# Patient Record
Sex: MALE | Race: WHITE | URBAN - METROPOLITAN AREA
[De-identification: names, ages, dates, MRNs, and addresses within clinical notes are randomized per-mention and may not be internally consistent; named-entity substitution may affect disease eponyms.]

---

## 2017-03-18 ENCOUNTER — RADIANT APPOINTMENT (OUTPATIENT)
Dept: GENERAL RADIOLOGY | Facility: CLINIC | Age: 62
End: 2017-03-18
Attending: FAMILY MEDICINE
Payer: OTHER MISCELLANEOUS

## 2017-03-18 ENCOUNTER — OFFICE VISIT (OUTPATIENT)
Dept: URGENT CARE | Facility: URGENT CARE | Age: 62
End: 2017-03-18
Payer: OTHER MISCELLANEOUS

## 2017-03-18 VITALS
DIASTOLIC BLOOD PRESSURE: 87 MMHG | OXYGEN SATURATION: 98 % | HEART RATE: 91 BPM | BODY MASS INDEX: 36.68 KG/M2 | HEIGHT: 75 IN | WEIGHT: 295 LBS | SYSTOLIC BLOOD PRESSURE: 144 MMHG | TEMPERATURE: 97.8 F

## 2017-03-18 DIAGNOSIS — S82.401A TIBIA/FIBULA FRACTURE, RIGHT, CLOSED, INITIAL ENCOUNTER: ICD-10-CM

## 2017-03-18 DIAGNOSIS — Z02.6 ENCOUNTER RELATED TO WORKER'S COMPENSATION CLAIM: ICD-10-CM

## 2017-03-18 DIAGNOSIS — S82.201A TIBIA/FIBULA FRACTURE, RIGHT, CLOSED, INITIAL ENCOUNTER: ICD-10-CM

## 2017-03-18 DIAGNOSIS — S97.81XA CRUSH INJURY OF RIGHT FOOT, INITIAL ENCOUNTER: Primary | ICD-10-CM

## 2017-03-18 DIAGNOSIS — S82.54XA CLOSED NONDISPLACED FRACTURE OF MEDIAL MALLEOLUS OF RIGHT TIBIA, INITIAL ENCOUNTER: ICD-10-CM

## 2017-03-18 PROCEDURE — 73610 X-RAY EXAM OF ANKLE: CPT | Mod: RT

## 2017-03-18 PROCEDURE — 99214 OFFICE O/P EST MOD 30 MIN: CPT | Performed by: FAMILY MEDICINE

## 2017-03-18 PROCEDURE — 73630 X-RAY EXAM OF FOOT: CPT | Mod: RT

## 2017-03-18 NOTE — PATIENT INSTRUCTIONS
1. Crush injury of right foot, initial encounter  - XR Foot Right G/E 3 Views  - XR Ankle Right G/E 3 Views  - ORTHO  REFERRAL; Future    2. Tibia/fibula fracture, right, closed, initial encounter: placed on camp boot  -Tylenol or ibuprofen prn for pain-control   -Letter to work provided  -Schedule an appointment with orthopedics within 1-2 days   - ORTHO  REFERRAL; Future    3. Closed nondisplaced fracture of medial malleolus of right tibia, initial encounter  - ORTHO  REFERRAL; Future

## 2017-03-18 NOTE — MR AVS SNAPSHOT
After Visit Summary   3/18/2017    Kaiser Sylvester    MRN: [id number]           Patient Information     Date Of Birth          1955        Visit Information        Provider Department      3/18/2017 9:30 AM Rocky Torrez MD [facility]        Today's Diagnoses     Crush injury of right foot, initial encounter    -  1    Tibia/fibula fracture, right, closed, initial encounter        Closed nondisplaced fracture of medial malleolus of right tibia, initial encounter          Care Instructions    1. Crush injury of right foot, initial encounter  - XR Foot Right G/E 3 Views  - XR Ankle Right G/E 3 Views  - ORTHO  REFERRAL; Future    2. Tibia/fibula fracture, right, closed, initial encounter: placed on camp boot  -Tylenol or ibuprofen prn for pain-control   -Letter to work provided  -Schedule an appointment with orthopedics within 1-2 days   - ORTHO  REFERRAL; Future    3. Closed nondisplaced fracture of medial malleolus of right tibia, initial encounter  - ORTHO  REFERRAL; Future        Follow-ups after your visit        Additional Services     ORTHO  REFERRAL       [facility] is referring you to the Orthopedic  Services at [facility].       The  Representative will assist you in the coordination of your Orthopedic and Musculoskeletal Care as prescribed by your physician.    The  Representative will call you within 1 business day to help schedule your appointment, or you may contact the  Representative at:    All areas ~ [phone]     Type of Referral : [facility]       Timeframe requested: 1 - 2 days    Coverage of these services is subject to the terms and limitations of your health insurance plan.  Please call member services at your health plan with any benefit or coverage questions.      If X-rays, CT or MRI's have been performed,  "please contact the facility where they were done to arrange for , prior to your scheduled appointment.  Please bring this referral request to your appointment and present it to your specialist.                  Future tests that were ordered for you today     Open Future Orders        Priority Expected Expires Ordered    ORTHO  REFERRAL ASAP  3/18/2018 3/18/2017            Who to contact     If you have questions or need follow up information about today's clinic visit or your schedule please contact [facility] directly at [phone].  Normal or non-critical lab and imaging results will be communicated to you by Diabetohart, letter or phone within 4 business days after the clinic has received the results. If you do not hear from us within 7 days, please contact the clinic through Espinelat or phone. If you have a critical or abnormal lab result, we will notify you by phone as soon as possible.  Submit refill requests through v2 Ratings or call your pharmacy and they will forward the refill request to us. Please allow 3 business days for your refill to be completed.          Additional Information About Your Visit        DiabetoharIND Lifetech Information     v2 Ratings lets you send messages to your doctor, view your test results, renew your prescriptions, schedule appointments and more. To sign up, go to [email or web address] . Click on \"Log in\" on the left side of the screen, which will take you to the Welcome page. Then click on \"Sign up Now\" on the right side of the page.     You will be asked to enter the access code listed below, as well as some personal information. Please follow the directions to create your username and password.     Your access code is: [email or web address]  Expires: 2017 12:00 PM     Your access code will  in 90 days. If you need help or a new code, please call your [facility] or [phone].        Care EveryWhere ID     This is your Care EveryWhere ID. This " "could be used by other organizations to access your [place] medical records  [id number]        Your Vitals Were     Pulse Temperature Height Pulse Oximetry BMI (Body Mass Index)       91 97.8  F (36.6  C) (Tympanic) 6' 3\" (1.905 m) 98% 36.87 kg/m2        Blood Pressure from Last 3 Encounters:   03/18/17 144/87   01/27/12 187/106   06/21/04 122/98    Weight from Last 3 Encounters:   03/18/17 295 lb (133.8 kg)   01/27/12 296 lb 9 oz (134.5 kg)   06/21/04 (!) 311 lb (141.1 kg)              We Performed the Following     XR Ankle Right G/E 3 Views     XR Foot Right G/E 3 Views        Primary Care Provider    Physician No Ref-Primary       No address on file        Thank you!     Thank you for choosing [facility]  for your care. Our goal is always to provide you with excellent care. Hearing back from our patients is one way we can continue to improve our services. Please take a few minutes to complete the written survey that you may receive in the mail after your visit with us. Thank you!             Your Updated Medication List - Protect others around you: Learn how to safely use, store and throw away your medicines at www.disposemymeds.org.          This list is accurate as of: 3/18/17 12:00 PM.  Always use your most recent med list.                   Brand Name Dispense Instructions for use    hydrochlorothiazide 25 MG tablet    HYDRODIURIL    30 tablet    Take 1 tablet by mouth daily.       LISINOPRIL PO            "

## 2017-03-18 NOTE — NURSING NOTE
"Chief Complaint   Patient presents with     Urgent Care     Ankle/Foot right     c/o right ankle and foot pain for 1 day       Initial /87 (BP Location: Left arm, Patient Position: Chair, Cuff Size: Adult Regular)  Pulse 91  Temp 97.8  F (36.6  C) (Tympanic)  Ht 6' 3\" (1.905 m)  Wt 295 lb (133.8 kg)  SpO2 98%  BMI 36.87 kg/m2 Estimated body mass index is 36.87 kg/(m^2) as calculated from the following:    Height as of this encounter: 6' 3\" (1.905 m).    Weight as of this encounter: 295 lb (133.8 kg).  Medication Reconciliation: complete   Herminia Thomas MA    "

## 2017-03-18 NOTE — LETTER
[facility]  [address] [address]  Phone: [phone]    March 18, 2017        Kaiser Sylvester  [address] [place] [facility] [address]          To whom it may concern:    RE: Kaiser Sylvester    Patient was seen and treated today at our clinic.  Patient may return to work on April 3rd, 2017  with the following:  No working or lifting restrictions    Please contact me for questions or concerns.      Sincerely,        Rocky Torrez MD

## 2017-03-18 NOTE — PROGRESS NOTES
"SUBJECTIVE:  Chief Complaint   Patient presents with     Urgent Care     Ankle/Foot right     c/o right ankle and foot pain for 1 day     Kaiser Sylvester is a 62 year old male presents with a chief complaint of right foot and leg pain, swelling and redness.  The injury occurred 1 day(s) ago.   The injury happened while at work. How: Garage  door fell on him from 10-12 ft high immediate pain, immediate swelling.  The patient complained of moderate pain  and has had decreased ROM.  Pain exacerbated by walking.  Relieved by rest.  He treated it initially with ice, heat and Tylenol. This is the first time this type of injury has occurred to this patient.   .  ROS:  CONSTITUTIONAL:NEGATIVE for fever, chills, change in weight  ENT/MOUTH: NEGATIVE for ear, mouth and throat problems  RESP:NEGATIVE for significant cough or SOB  CV: NEGATIVE for chest pain, palpitations or peripheral edema  MUSCULOSKELETAL: POSITIVE  for right leg and foot    EXAM:   /87 (BP Location: Left arm, Patient Position: Chair, Cuff Size: Adult Regular)  Pulse 91  Temp 97.8  F (36.6  C) (Tympanic)  Ht 6' 3\" (1.905 m)  Wt 295 lb (133.8 kg)  SpO2 98%  BMI 36.87 kg/m2  Gen: healthy, alert, mild distress and healthy,alert,no distress  Extremity: ankle has significant swelling at left medial malleolus and limited range of motion due to pain. Significant bruising .   There is compromise to the distal circulation.  Pulses are +2 and CRT is brisk  CHEST: clear to auscultation  CV: regular rate and rhythm  NEURO: Normal strength and tone, sensory exam grossly normal, mentation intact and speech normal    X-RAY was done.    ASSESSMENT:   1. Crush injury of right foot, initial encounter  Encounter related to worker's compensation claim: He had injury at work yesterday and had the garage door fell onto his from 10-12ft high. Xray showed fracture of distal fibula and medial malleolus.    - XR Foot Right G/E 3 Views  - XR Ankle Right G/E 3 Views  - ORTHO "  REFERRAL; Future    2. Tibia/fibula fracture, right, closed, initial encounter  -I called the orthopedic resident at [facility] and discussed the case with them. Recommended either short leg splinting or walking boot and follow up in next week. Patient is placed on walking boot. Advised to avoid weight bearing.   -Ibuprofen or tylenol prn for pain-control   -Letter to work provided.   -Advised to schedule an appointment with orthopedics within 1-2 days   - ORTHO  REFERRAL; Future    3. Closed nondisplaced fracture of medial malleolus of right tibia, initial encounter  - ORTHO  REFERRAL; Future      Rocky Torrez MD  [facility]

## 2017-03-20 ENCOUNTER — OFFICE VISIT (OUTPATIENT)
Dept: PODIATRY | Facility: CLINIC | Age: 62
End: 2017-03-20
Payer: OTHER MISCELLANEOUS

## 2017-03-20 DIAGNOSIS — S82.841A BIMALLEOLAR ANKLE FRACTURE, RIGHT, CLOSED, INITIAL ENCOUNTER: Primary | ICD-10-CM

## 2017-03-20 PROCEDURE — 99203 OFFICE O/P NEW LOW 30 MIN: CPT | Performed by: PODIATRIST

## 2017-03-20 NOTE — LETTER
"  3/20/2017       RE: Kaiser Sylvester  [address]  [place] [address]           Dear Colleague,    Thank you for referring your patient, Kaiser Sylvester, to the [facility]. Please see a copy of my visit note below.      ASSESSMENT/PLAN:    Encounter Diagnosis   Name Primary?     Bimalleolar ankle fracture, right, closed, initial encounter Yes     Although minimal displacement, this is not a stable fracture.  I have referred him to Dr. Gary, as the patient might need open reduction with internal fixation.     CAM walker  ACE compression  Ice  Elevation  Walker     There is no height or weight on file to calculate BMI.    Weight management plan: Patient was referred to their PCP to discuss a diet and exercise plan.      Michael Peña DPM, FACFAS, MS    [place] Department of Podiatry/Foot & Ankle Surgery      ____________________________________________________________________    HPI:         Chief Complaint: two fractures, right ankle.  A heavy, solid core,  12 ft utility garage door fell on the lateral aspect of his right leg, just proximal to the ankle.  Onset of problem: 4 days  Pain/ discomfort is described as:  \"not much pain when sitting\"  Ratin/10 at worst when walking  Frequency:  daily    The pain is made worse with walking  Previous treatment: ibuprofen. He was seen in urgent care on 3/18/17.  X-rays were done and CAM walker provided.   *No past medical history on file.*  *  Past Surgical History   Procedure Laterality Date     Orthopedic surgery       knee surgery as a child   *  *  Current Outpatient Prescriptions   Medication Sig Dispense Refill     LISINOPRIL PO        hydrochlorothiazide (HYDRODIURIL) 25 MG tablet Take 1 tablet by mouth daily. 30 tablet 1       ROS:     A 10-point review of systems was performed and is positive for that noted in the HPI and as seen below.  All other areas are negative.     Numbness in feet?  no   Calf pain with walking? " no  Recent foot/ankle injury? See HPI  Weight change over past 12 months? 5-10# gain  Self perception as overweight? yes  Recent flu-like symptoms? no  Joint pain other than feet ? no    Social History: Employment:  ;  Exercise/Physical activity:  Swimming 2x/ week;  Tobacco use:  no  Social History     Social History     Marital status:      Spouse name: N/A     Number of children: 1     Years of education: 12     Occupational History           Social History Main Topics     Smoking status: Former Smoker     Quit date: 6/21/1987     Smokeless tobacco: Not on file     Alcohol use Yes      Comment: has 2-3 beers, 5 nights a week     Drug use: Not on file     Sexual activity: Yes     Partners: Female     Other Topics Concern     Not on file     Social History Narrative       Family history:  Family History   Problem Relation Age of Onset     DIABETES Father      Hypertension Father      CEREBROVASCULAR DISEASE Father        Rheumatoid arthritis:  no  Foot Problems: sibling  Diabetes: parent      EXAM:    Vitals: There were no vitals taken for this visit.  BMI: There is no height or weight on file to calculate BMI.  Height: Data Unavailable    Constitutional/ general:  Pt is in no apparent distress, appears well-nourished.  Cooperative with history and physical exam.     Vascular:  Pedal pulses are palpable bilaterally for both the DP and PT arteries.  CFT < 3 sec.  Pedal hair growth noted.     Neuro:  Alert and oriented x 3. Coordinated gait.  Light touch sensation is intact to the L4, L5, S1 distributions. No obvious deficits.  No evidence of neurological-based weakness, spasticity, or contracture in the lower extremities.     Derm: Normal texture and turgor.  No erythema, ecchymosis, or cyanosis. Excoriation over the lateral right leg, just proximal to the ankle joint.     Musculoskeletal:    Lower extremity muscle strength is normal.  Patient is ambulatory without an assistive  device or brace .  Significant edema around the right ankle with some light ecchymosis. No pain with external rotation of the right foot on the ankle.  Tenderness over both the medial and lateral malleolus.      Radiographic Exam:  X-Ray Findings:  I personally reviewed the right ankle films.  Somewhat comminuted right distal fibula fracture, proximal to the ankle joint. Transverse fracture, medial malleolus, at the level of the ankle joint.  Small fracture fragment, anterior tibia. There appears to be medial widening.       Michael Peña DPM, FACFAS, MS    [place] [facility] of Podiatry/Foot & Ankle Surgery                Again, thank you for allowing me to participate in the care of your patient.        Sincerely,    Michael Peña DPM

## 2017-03-20 NOTE — MR AVS SNAPSHOT
"              After Visit Summary   3/20/2017    Kaiser Sylvester    MRN: [id number]           Patient Information     Date Of Birth          1955        Visit Information        Provider Department      3/20/2017 10:15 AM Michael Peña DPM [facility]        Today's Diagnoses     Bimalleolar ankle fracture, right, closed, initial encounter    -  1       Follow-ups after your visit        Your next 10 appointments already scheduled     Mar 21, 2017  2:45 PM CDT   New Visit with Jorge Luis Gary DPM   [facility] ([facility])    [address]  [address]  [place] [address]   [phone]              Who to contact     If you have questions or need follow up information about today's clinic visit or your schedule please contact [facility] directly at [phone].  Normal or non-critical lab and imaging results will be communicated to you by MyChart, letter or phone within 4 business days after the clinic has received the results. If you do not hear from us within 7 days, please contact the clinic through [place] reQRdoshart or phone. If you have a critical or abnormal lab result, we will notify you by phone as soon as possible.  Submit refill requests through Wavecraft or call your pharmacy and they will forward the refill request to us. Please allow 3 business days for your refill to be completed.          Additional Information About Your Visit        MyChart Information     Wavecraft lets you send messages to your doctor, view your test results, renew your prescriptions, schedule appointments and more. To sign up, go to [email or web address] . Click on \"Log in\" on the left side of the screen, which will take you to the Welcome page. Then click on \"Sign up Now\" on the right side of the page.     You will be asked to enter the access code listed below, as well as some personal information. Please follow the directions to create " your username and password.     Your access code is: [email or web address]  Expires: 2017 12:00 PM     Your access code will  in 90 days. If you need help or a new code, please call your [facility] or [phone].        Care EveryWhere ID     This is your Care EveryWhere ID. This could be used by other organizations to access your [place] medical records  [id number]         Blood Pressure from Last 3 Encounters:   17 144/87   12 187/106   04 122/98    Weight from Last 3 Encounters:   17 295 lb (133.8 kg)   12 296 lb 9 oz (134.5 kg)   04 (!) 311 lb (141.1 kg)              Today, you had the following     No orders found for display       Primary Care Provider    Physician No Ref-Primary       No address on file        Thank you!     Thank you for choosing [facility]  for your care. Our goal is always to provide you with excellent care. Hearing back from our patients is one way we can continue to improve our services. Please take a few minutes to complete the written survey that you may receive in the mail after your visit with us. Thank you!             Your Updated Medication List - Protect others around you: Learn how to safely use, store and throw away your medicines at www.disposemymeds.org.          This list is accurate as of: 3/20/17  1:14 PM.  Always use your most recent med list.                   Brand Name Dispense Instructions for use    hydrochlorothiazide 25 MG tablet    HYDRODIURIL    30 tablet    Take 1 tablet by mouth daily.       LISINOPRIL PO

## 2017-03-20 NOTE — PROGRESS NOTES
"  ASSESSMENT/PLAN:    Encounter Diagnosis   Name Primary?     Bimalleolar ankle fracture, right, closed, initial encounter Yes     Although minimal displacement, this is not a stable fracture.  I have referred him to Dr. Gary, as the patient might need open reduction with internal fixation.     CAM walker  ACE compression  Ice  Elevation  Walker     There is no height or weight on file to calculate BMI.    Weight management plan: Patient was referred to their PCP to discuss a diet and exercise plan.      Michael Peña, MIKEY, FACFAS, MS    [place] Department of Podiatry/Foot & Ankle Surgery      ____________________________________________________________________    HPI:         Chief Complaint: two fractures, right ankle.  A heavy, solid core,  12 ft utility garage door fell on the lateral aspect of his right leg, just proximal to the ankle.  Onset of problem: 4 days  Pain/ discomfort is described as:  \"not much pain when sitting\"  Ratin/10 at worst when walking  Frequency:  daily    The pain is made worse with walking  Previous treatment: ibuprofen. He was seen in urgent care on 3/18/17.  X-rays were done and CAM walker provided.   *No past medical history on file.*  *  Past Surgical History   Procedure Laterality Date     Orthopedic surgery       knee surgery as a child   *  *  Current Outpatient Prescriptions   Medication Sig Dispense Refill     LISINOPRIL PO        hydrochlorothiazide (HYDRODIURIL) 25 MG tablet Take 1 tablet by mouth daily. 30 tablet 1       ROS:     A 10-point review of systems was performed and is positive for that noted in the HPI and as seen below.  All other areas are negative.     Numbness in feet?  no   Calf pain with walking? no  Recent foot/ankle injury? See HPI  Weight change over past 12 months? 5-10# gain  Self perception as overweight? yes  Recent flu-like symptoms? no  Joint pain other than feet ? no    Social History: Employment:  ;  Exercise/Physical " activity:  Swimming 2x/ week;  Tobacco use:  no  Social History     Social History     Marital status:      Spouse name: N/A     Number of children: 1     Years of education: 12     Occupational History           Social History Main Topics     Smoking status: Former Smoker     Quit date: 6/21/1987     Smokeless tobacco: Not on file     Alcohol use Yes      Comment: has 2-3 beers, 5 nights a week     Drug use: Not on file     Sexual activity: Yes     Partners: Female     Other Topics Concern     Not on file     Social History Narrative       Family history:  Family History   Problem Relation Age of Onset     DIABETES Father      Hypertension Father      CEREBROVASCULAR DISEASE Father        Rheumatoid arthritis:  no  Foot Problems: sibling  Diabetes: parent      EXAM:    Vitals: There were no vitals taken for this visit.  BMI: There is no height or weight on file to calculate BMI.  Height: Data Unavailable    Constitutional/ general:  Pt is in no apparent distress, appears well-nourished.  Cooperative with history and physical exam.     Vascular:  Pedal pulses are palpable bilaterally for both the DP and PT arteries.  CFT < 3 sec.  Pedal hair growth noted.     Neuro:  Alert and oriented x 3. Coordinated gait.  Light touch sensation is intact to the L4, L5, S1 distributions. No obvious deficits.  No evidence of neurological-based weakness, spasticity, or contracture in the lower extremities.     Derm: Normal texture and turgor.  No erythema, ecchymosis, or cyanosis. Excoriation over the lateral right leg, just proximal to the ankle joint.     Musculoskeletal:    Lower extremity muscle strength is normal.  Patient is ambulatory without an assistive device or brace .  Significant edema around the right ankle with some light ecchymosis. No pain with external rotation of the right foot on the ankle.  Tenderness over both the medial and lateral malleolus.      Radiographic Exam:  X-Ray Findings:  I  personally reviewed the right ankle films.  Somewhat comminuted right distal fibula fracture, proximal to the ankle joint. Transverse fracture, medial malleolus, at the level of the ankle joint.  Small fracture fragment, anterior tibia. There appears to be medial widening.       Michael Peña DPM, FACFAS, MS    [place] Department of Podiatry/Foot & Ankle Surgery

## 2017-03-20 NOTE — Clinical Note
This everett will see you tomorrow.  I'll scrub with ya, if able.  I tried to use comm manager, but it didn't recognize your name.

## 2017-03-21 ENCOUNTER — OFFICE VISIT (OUTPATIENT)
Dept: PODIATRY | Facility: CLINIC | Age: 62
End: 2017-03-21
Payer: OTHER MISCELLANEOUS

## 2017-03-21 VITALS — HEART RATE: 98 BPM | HEIGHT: 75 IN | WEIGHT: 295 LBS | BODY MASS INDEX: 36.68 KG/M2 | RESPIRATION RATE: 18 BRPM

## 2017-03-21 DIAGNOSIS — S82.841D BIMALLEOLAR FRACTURE OF RIGHT ANKLE, CLOSED, WITH ROUTINE HEALING, SUBSEQUENT ENCOUNTER: Primary | ICD-10-CM

## 2017-03-21 PROCEDURE — 99213 OFFICE O/P EST LOW 20 MIN: CPT | Performed by: PODIATRIST

## 2017-03-21 NOTE — PROGRESS NOTES
"Foot & Ankle Surgery   March 21, 2017    S:  Pt is seen today for evaluation of right lower extremity work-comp bimalleolar ankle fracture, referred by Dr Peña for surgical evaluation.  He's WBAT in a CAM with a walker.  Original injury 3/17/17, a garage door fell from approximately 10 feet, brushed his head and shoulder, and landed on his ankle.  xrays at his visit with Dr Torrez showed bimalleolar fracture with lateral talar deviation.  He's been elevating and doing some icing.    Vitals:    03/21/17 1447   Pulse: 98   Resp: 18   Weight: 295 lb (133.8 kg)   Height: 6' 3\" (1.905 m)   '      ROS - Pos for CC.  Patient denies current nausea, vomiting, chills, fevers, belly pain, calf pain, chest pain or SOB.  Complete remainder of ROS it otherwise neg.      PE:  Gen:   No apparent distress  Neuro:   A&Ox3, no deficits  Psych:    Answering questions appropriately for age and situation with normal affect  Head:    NCAT  Eye:    Visual scanning without deficit  Ear:    Response to auditory stimuli wnl  Lung:    Non-labored breathing on RA noted  Abd:    NTND per patient report  Lymph:    Neg for pitting/non-pitting edema BLE  Vasc:  Mild edema circumferentially around ankle  Neuro:    Light touch sensation intact to all sensory nerve distributions without paresthesias  Derm:   Superficial abrasion lateral right lower leg.  Not quite able to pinch skin over distal fibula or medial malleolus  MSK:  Bimalleolar ankle fracture, ankle not stressed.    Calf:    Neg for redness, swelling or tenderness    Imaging:  Xray R ankle 3/18/17 - IMPRESSION: Oblique fracture of the distal fibula. Transverse fracture  of the medial malleolus. Minimal subluxation of the talus laterally.  -xrays R foot 3/18/17 -  IMPRESSION: Negative.     Labs:  Vit D draw prior to surgery    Assessment:  62 year old male with bimalleolar ankle fracture with mild lateral talar deviation      Plan:  Discussed etiologies/options  1.  R bimalleolar ankle " fracture  -recommend ORIF to maintain stability of the ankle and prevent degenerative changes to the ankle  -advised aggressive elevation, icing, and compression to prepare soft tissue envelope; tensogrip dispensed  -surg nicholas handout dispensed and discussed      Follow up:  For surgery consult    Work/Job duties - , works on [employer]; [employer]  Smoking history - none since 1986  Vit D - draw prior to surgery  Clot history - neg  Allergies to surgical implants/suture - none  Surgery location - [facility]    Consent:  Right ankle fracture repair    Procedure(s):  1.  above    Diagnosis:  Bimalleolar ankle fracture    Equipment:  arthrex fibular and tibial fracture sets; arthrex tightrope     Position:  supine    Tourniquet:   thigh    Mini-C:  yes    Anesthesia:  GETA with popliteal/saphenous nerve blocks    Allergies:    Allergies   Allergen Reactions     No Known Allergies        Antibiotics:  3g ancef    Procedure time:   2 1/2 hours    Dispo:  Same day    Jeffrey catheter:  no    Crutch/walker training and fitting:  no     OR Clot prevention:  SCD left lower extremity     Surgery location:  [facility]      Trevin Kang          Body mass index is 36.87 kg/(m^2).  Weight management plan: Patient was referred to their PCP to discuss a diet and exercise plan.         Jorge Luis Gary DPM   Podiatric Foot & Ankle Surgeon  [facility]  [phone]

## 2017-03-21 NOTE — NURSING NOTE
"Chief Complaint   Patient presents with     RECHECK     R ankle fx, 2nd opinion per Casey EUGENE 3/17/17, WC       Initial Pulse 98  Resp 18  Ht 6' 3\" (1.905 m)  Wt 295 lb (133.8 kg)  BMI 36.87 kg/m2 Estimated body mass index is 36.87 kg/(m^2) as calculated from the following:    Height as of this encounter: 6' 3\" (1.905 m).    Weight as of this encounter: 295 lb (133.8 kg).  Medication Reconciliation: complete  "

## 2017-03-21 NOTE — Clinical Note
Good afternoon  I saw [facility] today for his right lower extremity bimalleolar ankle fracture.  Because of an unstable fracture pattern and lateral deviation of the talus, I advised fixation.  He was given our surgery scheduling handout, and will follow up for the surgery consult.  I advised aggressive icing, elevation and compression to prepare the soft tissue envelope.  thanks  Jorge Luis

## 2017-03-21 NOTE — Clinical Note
Art Layton  I saw the bimalleolar ankle fracture, and advised fixation.  We're hoping to get it done next Wednesday.  I don't recall if you said you're coming back next Wedneday, or if you're still out by then, but hopefully you can join us.  Jorge Luis

## 2017-03-21 NOTE — MR AVS SNAPSHOT
"              After Visit Summary   3/21/2017    [facility]    MRN: [id number]           Patient Information     Date Of Birth          1955        Visit Information        Provider Department      3/21/2017 2:45 PM Jorge Luis Gary DPM [facility]        Care Instructions      Dr. Gary's Clinic Locations:         Monday Tuesday   [facility]   [address] [address] [address], [address]   [place] [address] [place] [address]   [phone]       Wednesday:  Surgery Day    Surgery Scheduling Line - [phone]       Thursday Morning Thursday Afternoon   [facility]   [address], [address]   [place] [address] [place] [address]   [phone]       Friday Morning To Schedule an Appointment    [facility] Call: [phone] [address] [place] [address]  [phone] PLEASE FAX ALL FORMS TO: [phone]     Follow up:     Surgical planning.  If you have decided to have surgery, follow these few steps to get the procedure scheduled and to have the proper paperwork filled out.  If you are unsure about surgery, or would like to sit down and further discuss your issue and treatment options, please make a clinic appointment with Dr Gary.    1.  Pick the date that you would like to have surgery.  Keep in mind that you will likely need at least 2 weeks off after the procedure for proper rest and healing.    2.  Call the surgery scheduling line at [phone] to get the procedure scheduled.  My , Radha, will assist you in getting surgery set up.      3.  Make an appointment to see Dr Gary within 1-2 weeks of the date of surgery for your pre-operative consult.  When making the appointment, say \"I need to make a 30 minute surgical consult with Dr Gary\".  All the paperwork will be ready " "for you during the visit.  It is recommended that you bring a spouse, family member or friend with you.  There will be lots of information presented.  It can be overwhelming, and it is better to have someone there to help sort out the details.    4.  Make an appointment to see your Primary Care Physician within 4 weeks of the date of surgery for your \"Pre-operative History and Physical\".  This is done to make sure you are medically healthy to undergo surgery.        If you have any post-operative questions regarding your procedure, call our triage nurses at [phone].      PRICE THERAPY  Many aches and pains throughout the foot and ankle can be helped with many simple treatments. This is usually described as PRICE Therapy.      P - Protection - often times, inflammation/pain in the lower extremity is not able to improve simply because the areas involved are never allowed to rest. Every step we take can bother the problematic area. Protecting those areas is an important step in the healing process. This may involve a walking cast boot, a special insert/orthotic device, an ankle brace, or simply avoiding barefoot walking.    R - Rest - in addition to protecting the foot/ankle, resting is an important, but often times difficult, treatment option. Getting off your feet when they bother you, and specifically avoiding activities that cause pain/discomfort, are very beneficial to prevent, and treat, foot/ankle pain.      I - Ice - icing regularly can help to decrease inflammation and swelling in the foot, thus decreasing pain. Using an ice pack or a bag of frozen veggies works very well. Ice for 20 minutes multiple times per day as needed.  Do not place the ice directly on the skin as this can cause tissue damage.    C - Compression - using a compression wrap or an ACE wrap can help to decrease swelling, which can help to decrease pain. Wearing the wraps is generally not needed at night, but they should be worn on a " regular basis when you are going to be on your feet for prolonged periods as gravity tends to pull fluids down to your feet/ankles.    E - Elevation - elevating your lower extremities multiple times daily for 15-20 minutes can help to decrease swelling, which works well in decreasing pain levels.    NSAID/Tylenol - Anti-inflammatories like Aleve or ibuprofen, and/or a pain medication, such as Tylenol, can help to improve pain levels and get the issue resolved sooner rather than later. Anyone with liver issues should be careful with Tylenol, and anyone with high blood pressure or heart, stomach or kidney issues should be careful with anti-inflammatories. Please ask if you have questions about these medications, including dosage.        Body Mass Index (BMI)    Many things can cause foot and ankle problems. Foot structure, activity level, foot mechanics and injuries are common causes of pain.    One very important issue that often goes unmentioned, is body weight.  Extra weight can cause increased stress on muscles, ligaments, bones and tendons. Sometimes just a few extra pounds is all it takes to put one over her/his threshold. Without reducing that stress, it can be difficult to alleviate pain.      Some people are uncomfortable addressing this issue, but we feel it is important for you to think about it. As Foot & Ankle specialists, our job is addressing the lower extremity problem and possible causes.     Regarding extra body weight, we encourage patients to discuss diet and weight management plans with their primary care doctors. It is this team approach that gives you the best opportunity for pain relief and getting you back on your feet.                Follow-ups after your visit        Who to contact     If you have questions or need follow up information about today's clinic visit or your schedule please contact [facility] directly at [phone].  Normal or non-critical lab and imaging results  "will be communicated to you by MyChart, letter or phone within 4 business days after the clinic has received the results. If you do not hear from us within 7 days, please contact the clinic through Anki or phone. If you have a critical or abnormal lab result, we will notify you by phone as soon as possible.  Submit refill requests through Anki or call your pharmacy and they will forward the refill request to us. Please allow 3 business days for your refill to be completed.          Additional Information About Your Visit        Anki Information     Anki lets you send messages to your doctor, view your test results, renew your prescriptions, schedule appointments and more. To sign up, go to [email or web address] . Click on \"Log in\" on the left side of the screen, which will take you to the Welcome page. Then click on \"Sign up Now\" on the right side of the page.     You will be asked to enter the access code listed below, as well as some personal information. Please follow the directions to create your username and password.     Your access code is: [email or web address]  Expires: 2017 12:00 PM     Your access code will  in 90 days. If you need help or a new code, please call your [facility] clinic or [phone].        Care EveryWhere ID     This is your Care EveryWhere ID. This could be used by other organizations to access your [facility] medical records  [id number]        Your Vitals Were     Pulse Respirations Height BMI (Body Mass Index)          98 18 6' 3\" (1.905 m) 36.87 kg/m2         Blood Pressure from Last 3 Encounters:   17 144/87   12 187/106   04 122/98    Weight from Last 3 Encounters:   17 295 lb (133.8 kg)   17 295 lb (133.8 kg)   12 296 lb 9 oz (134.5 kg)              Today, you had the following     No orders found for display       Primary Care Provider    Physician No Ref-Primary       No address on file        Thank you!     Thank you for " choosing [facility]  for your care. Our goal is always to provide you with excellent care. Hearing back from our patients is one way we can continue to improve our services. Please take a few minutes to complete the written survey that you may receive in the mail after your visit with us. Thank you!             Your Updated Medication List - Protect others around you: Learn how to safely use, store and throw away your medicines at www.disposemymeds.org.          This list is accurate as of: 3/21/17  3:04 PM.  Always use your most recent med list.                   Brand Name Dispense Instructions for use    hydrochlorothiazide 25 MG tablet    HYDRODIURIL    30 tablet    Take 1 tablet by mouth daily.       LISINOPRIL PO

## 2017-03-21 NOTE — PATIENT INSTRUCTIONS
"  Dr. Gary's Clinic Locations:         Monday Tuesday   [facility]   [address] [address]   [place] [address] [place] [address]   [phone]       Wednesday:  Surgery Day    Surgery Scheduling Line - [phone]       Thursday Morning Thursday Afternoon   [facility]   [address]   [place] [address] [place] [address]   [phone]       Friday Morning To Schedule an Appointment    [facility] Call: [phone] [address]    [place], [place] [address] [phone] PLEASE FAX ALL FORMS TO: [phone]     Follow up:     Surgical planning.  If you have decided to have surgery, follow these few steps to get the procedure scheduled and to have the proper paperwork filled out.  If you are unsure about surgery, or would like to sit down and further discuss your issue and treatment options, please make a clinic appointment with Dr Gary.    1.  Pick the date that you would like to have surgery.  Keep in mind that you will likely need at least 2 weeks off after the procedure for proper rest and healing.    2.  Call the surgery scheduling line at [phone] to get the procedure scheduled.  My , Radha, will assist you in getting surgery set up.      3.  Make an appointment to see Dr Gary within 1-2 weeks of the date of surgery for your pre-operative consult.  When making the appointment, say \"I need to make a 30 minute surgical consult with Dr Gary\".  All the paperwork will be ready for you during the visit.  It is recommended that you bring a spouse, family member or friend with you.  There will be lots of information presented.  It can be overwhelming, and it is better to have someone there to help sort out the details.    4.  Make an appointment to see your Primary Care " "Physician within 4 weeks of the date of surgery for your \"Pre-operative History and Physical\".  This is done to make sure you are medically healthy to undergo surgery.        If you have any post-operative questions regarding your procedure, call our triage nurses at [phone].      PRICE THERAPY  Many aches and pains throughout the foot and ankle can be helped with many simple treatments. This is usually described as PRICE Therapy.      P - Protection - often times, inflammation/pain in the lower extremity is not able to improve simply because the areas involved are never allowed to rest. Every step we take can bother the problematic area. Protecting those areas is an important step in the healing process. This may involve a walking cast boot, a special insert/orthotic device, an ankle brace, or simply avoiding barefoot walking.    R - Rest - in addition to protecting the foot/ankle, resting is an important, but often times difficult, treatment option. Getting off your feet when they bother you, and specifically avoiding activities that cause pain/discomfort, are very beneficial to prevent, and treat, foot/ankle pain.      I - Ice - icing regularly can help to decrease inflammation and swelling in the foot, thus decreasing pain. Using an ice pack or a bag of frozen veggies works very well. Ice for 20 minutes multiple times per day as needed.  Do not place the ice directly on the skin as this can cause tissue damage.    C - Compression - using a compression wrap or an ACE wrap can help to decrease swelling, which can help to decrease pain. Wearing the wraps is generally not needed at night, but they should be worn on a regular basis when you are going to be on your feet for prolonged periods as gravity tends to pull fluids down to your feet/ankles.    E - Elevation - elevating your lower extremities multiple times daily for 15-20 minutes can help to decrease swelling, which works well in decreasing pain " levels.    NSAID/Tylenol - Anti-inflammatories like Aleve or ibuprofen, and/or a pain medication, such as Tylenol, can help to improve pain levels and get the issue resolved sooner rather than later. Anyone with liver issues should be careful with Tylenol, and anyone with high blood pressure or heart, stomach or kidney issues should be careful with anti-inflammatories. Please ask if you have questions about these medications, including dosage.        Body Mass Index (BMI)    Many things can cause foot and ankle problems. Foot structure, activity level, foot mechanics and injuries are common causes of pain.    One very important issue that often goes unmentioned, is body weight.  Extra weight can cause increased stress on muscles, ligaments, bones and tendons. Sometimes just a few extra pounds is all it takes to put one over her/his threshold. Without reducing that stress, it can be difficult to alleviate pain.      Some people are uncomfortable addressing this issue, but we feel it is important for you to think about it. As Foot & Ankle specialists, our job is addressing the lower extremity problem and possible causes.     Regarding extra body weight, we encourage patients to discuss diet and weight management plans with their primary care doctors. It is this team approach that gives you the best opportunity for pain relief and getting you back on your feet.

## 2017-03-24 ENCOUNTER — TELEPHONE (OUTPATIENT)
Dept: PODIATRY | Facility: CLINIC | Age: 62
End: 2017-03-24

## 2017-03-24 NOTE — TELEPHONE ENCOUNTER
Work comp rep contacted. Office notes and imaging faxed to Roney Mcdowell @ [phone]. Requesting approval for Right ankle fracture repair by Dr. Gary.  Patient waiting until his swelling goes down to schedule surgery.

## 2017-03-31 NOTE — TELEPHONE ENCOUNTER
I faxed all appropriate paperwork over to Verenice Huffman, handling his Work Comp Claim on 3/24/2017.   Still waiting for a response on approval.

## 2017-04-03 ENCOUNTER — TELEPHONE (OUTPATIENT)
Dept: PODIATRY | Facility: CLINIC | Age: 62
End: 2017-04-03

## 2017-04-03 NOTE — TELEPHONE ENCOUNTER
Scheduled surgery for Right ankle fracture repair  on 4/12/2017 with Dr. Gary @ [facility] @ 11:05.  * Patient had Pre-op already.* 3/24/2017  Surgery education packet provided to patient.

## 2017-04-04 ENCOUNTER — OFFICE VISIT (OUTPATIENT)
Dept: PODIATRY | Facility: CLINIC | Age: 62
End: 2017-04-04
Payer: OTHER MISCELLANEOUS

## 2017-04-04 VITALS — BODY MASS INDEX: 36.68 KG/M2 | HEIGHT: 75 IN | WEIGHT: 295 LBS | HEART RATE: 88 BPM

## 2017-04-04 DIAGNOSIS — S82.891D CLOSED RIGHT ANKLE FRACTURE, WITH ROUTINE HEALING, SUBSEQUENT ENCOUNTER: Primary | ICD-10-CM

## 2017-04-04 PROCEDURE — 99214 OFFICE O/P EST MOD 30 MIN: CPT | Performed by: PODIATRIST

## 2017-04-04 RX ORDER — PRAVASTATIN SODIUM 40 MG
40 TABLET ORAL DAILY
COMMUNITY
Start: 2017-03-30

## 2017-04-04 NOTE — TELEPHONE ENCOUNTER
Surgery was approved today for [facility] on 4/12/2017 per Roney Zebro - [phone],   They give out no authorization/approval codes; please refer to Roney if any questions.

## 2017-04-04 NOTE — MR AVS SNAPSHOT
After Visit Summary   4/4/2017    [facility]    MRN: [id number]           Patient Information     Date Of Birth          1955        Visit Information        Provider Department      4/4/2017 1:15 PM Jorge Luis Gary DPM [facility]        Care Instructions      DR. GARY'S CLINIC LOCATIONS:       MONDAY - [place] TUESDAY - [place]   [address] [facility] [facility] [address] [address]   [place] [address] [place] [address]   [phone]       WEDNESDAY - SURGERY THURSDAY AM - [place]   [phone] [address]    [place], [place] [address] [phone]       THURSDAY PM - [place] FRIDAY AM - [place]   [address] [facility] [address] [place] [address] [place] [address]   [phone]       APPT SCHEDULING: SEND FAXES TO:   [phone]     Foot & Ankle Surgical Risks  Undergoing surgical procedure involves a certain amount of risks. Risks of complications vary, depending on the complexity of the surgery and how you take care of the surgical area during the healing process. Complications can range from minor infection to death. Some complications are temporary while others will be permanent. Your surgeon weighs the risk of complications versus the potential benefit of undergoing the procedure. You need to consider your tolerance for unexpected problems as you decide whether to undergo surgery.    Foot and ankle surgery involves cutting skin, bone, ligaments, blood vessels, and joints. These structures heal well but not without consequence. Any break in the skin can lead to infection. A deep infection can involve bone or joints, which can be devastating. Deep infection can lead to further surgeries such as amputation or could spread to other parts of the body. Most infections are minor and easily treated with oral antibiotics. Infections are often times from bacteria already present on your skin. Proper care  of the surgical site is an essential component of avoiding infection. Do not get the bandage wet and take proper care of external pins to avoid these complications.    Joint stiffness is inherent to any foot or ankle surgery. Joint surgery is a major component of reconstructive foot and ankle procedures. The ligaments and tissues around the joint release for exposure and/or correction of alignment. Scar tissue limits joint mobility. This can lead to hypersensitivity to touch, pain, problems wearing shoes, and need for revision surgery.    Bones do not always heal after surgery. Poor healing after a bone cut of joint fusion can lead to an extended period of casting, bone stimulators, or repeat surgery. A surgical nonunion is when bones do not heal properly. Smoking will almost always increase your risks of having postoperative complications. So if you smoke, quit now.    Bone grafting is sometimes necessary during the original or repeat surgery. Bone is sometimes taken from other parts of the body, freeze-dried cadaveric bone, or synthetic bone grafts may be used as needed.    Blood Clot Prevention and Education  Foot and ankle surgery can lead to blood clots in the large veins of your legs. This is called a deep venous thrombosis or DVT. A DVT can be life threatening if a portion of the clot breaks away and travels to the lungs. A clot in the lungs is called a pulmonary embolism or PE.    Your risk of developing a DVT is dependent on many factors. Risk factors associated with surgery include the type of surgery you are having (foot and ankle surgery is considered a much lower risk that knee, hip, or abdominal surgery), how long you are in a cast/boot, restricted ambulation, inability to move the ankle, and if you are hospitalized after surgery.    A number of medical conditions also increase your risk of DVT, including diabetes, use of estrogen medications such as birth control pills or postmenopausal medications,  obesity, pregnancy, heart failure, cancer, etc.    Other risk factors include heavy smoking, advanced age (over 60 years old, but even those over 40 have increased risk), family of personal history of blood clots or clotting disorders.    Symptoms of a DVT in the leg may include swelling, tenderness, a warm feeling or redness. A DVT can occur in both legs even if only one side is being treated. If you have these symptoms, call your doctor immediately.    Symptoms of a PE include chest pain, shortness of breath or the need to breath rapidly that is not associated with exercise, difficulty breathing, rapid heart rate, a feeling of passing out, and coughing or coughing up blood. A PE is an emergency so you need to be evaluated in the ER immediately if any of these symptoms occur. You could die from a PE. Call 911 right away. PE and DVT can occur without any symptoms in the leg and chest.    Prevention of DVT and PE is important. Your doctor may apply various types of compression to your legs before and after surgery. In addition, high risk patients may be place on a short course of blood thinning medication after surgery.    You can reduce you risk for DVT after surgery by getting up and moving/crawling/crutching around the house once or twice each hour while awake in the first few weeks. Seated range of motion exercises of your ankle and leg may also help. Moving your legs keeps blood flowing through your leg veins and reduced any pooling of blood that may clot. Be sure to follow your doctor's instructions on elevation and weightbearing restrictions.      Surgical Dressing  Your surgical dressing is a sterile dressing and should be left in place until removed by your doctor or their assistant at your first postop visit in clinic. Keep the dressing dry by covering it with a plastic bag during showers, taking baths with your surgical foot hanging outside of the tub, or by sponge bathing. If the dressing does become wet  or dirty, call the clinic as it most likely needs to be changed. Do not change it yourself unless told otherwise by your surgeon. The safest plan is to wait to shower for three days after surgery. So take a long shower the morning of surgery.    Do not wear regular shoes with a surgical bandage and/or external pins in your foot. Wear loose fitting clothing that will easily slide over the dressing. Do not cover your surgical foot with blankets as it can contaminate the dressing. Also, remember to keep it away from your pets as they may try to chew on it.    If your surgeon places external pins in your foot, you must keep your foot dry until the pins are removed at six to eight weeks after surgery. pins should be covered for protection but examine the pin sites for loosening, movement, infection, or drainage whenever possible. Do not apply ointment on the pin sites and never push a loose pin back into place.    PreSurgical Medication Recommendations  Certain prescription, over-the-counter and herbal medications interfere with healing after an operation. The main concern related to medications that increase bleeding at the surgical site. Excess blood under the incision results in poor wound healing, excess pain, increased scarring, and a higher risk of infection.    Some medication slow the healing process of bone. Medications can also interfere with anesthesia drugs that keep you asleep during the operation. It is important to ensure that these medications are out of your system prior to the operation. The list below details a number of medications that are of concern. Pay special attention to how long you should avoid these medications prior to surgery. Please note that this list is not complete. You should ask your surgeon, pharmacist, or primary care physician if you are uncertain about other medications. Any herbal supplement not listed should be discontinued at least one week prior to surgery.    Aspirin: stop  one week prior and may restart the day after surgery. This medication promotes bleeding.    Motrin/Ibuprofen/Aleve/Advil/NSAIDS: stop one week prior to surgery. These medications promote bleeding and may delay bone healing. Avoid these medications at least six weeks postop.    Coumadin: your primary care doctor will manage your coumadin in relation to surgery.    Enbrel: stop two weeks prior and may restart two weeks after. It may affect soft tissue healing and increase infection risk.    Remicade: stop eight to twelve weeks prior and may restart two weeks after. It can affect soft tissue healing and increase infection risk.    Humera: stop four weeks prior and may restart two weeks after. It can affect soft tissue healing and increase infection risk.  Methotrexate: stop taking on dose prior to surgery. It may be restarted when the wound is healing well.    Kava: stop at least one day prior and may restart one day after. It can cause increased sedative effects of anesthetics.    Ephedra (ma quintanilla): stop at least one day prior and may restart one day after. It can increase risk of heart attack or stroke and bleeding at the surgical site.    [place]'s Wort: stop at least five days prior and may restart one day after. It can diminish the effects of medications given during surgery.    Ginseng: stop at least one week prior and may restart one day after. It lowers blood sugar and can increase bleeding at the surgical site.    Ginkgo: stop 36 hours prior and may restart one day after. It can increase bleeding at the surgical site.    Garlic: stop at least one week prior and may restart one day after.    Valerian: slowly decrease the dose over a few weeks prior to avoid withdrawal symptoms. It can increase sedative effects of anesthetics.    Echinacea: no stop/start date. It can be associated with allergic reactions and suppression of your immune system.    Vitamin E/Omgea-3/Fish Oil/Flax/Glucosamine/Chondroitin: stop two  weeks prior and may restart one day after. They can increase bleeding at the surgical site.        Tips for Successful PostOperative Healing  How you care for your surgical site is critically important to achieve successful results. Avoidance of injury, infection, excess swelling, scar tissue, and stiffness are completely dependent on the care you provide over the next six weeks after surgery.    Your foot requires significant rest and elevation. Sitting for long hours with your foot elevated, however, will create its own problems. Expect muscle aches, back pain, cramps etc. Optimal posture, lumbar support, back exercises, ice, and heat may help with your new aches and pains. DO not apply a heating pad to your foot or leg, as this can worsen pain or swelling. Instead apply ice packs behind the involved knee. Do not apply it directly to the skin.    Narcotic pain medications and inactivity may also cause constipation. Limiting the use of these narcotics will help minimize this problem. The pain medication will not completely alleviate your pain. The purpose of pain pills is to take the edge off and help you get through the first few days. You can substitute extra strength tylenol if pain is more mild. Do not take Tylenol and prescription pain pills at the same time. Do not take more than 4,000mg of Tylenol in 24 hours. You can also minimize constipation by drinking plenty of water, eating lots of fruits and veggies, and taking the appropriate amount of Metamucil or other fiber supplements. These measures should be continued while on narcotic pain medications and if you remain inactive.    Showering can be a major challenge after surgery. Your incision requires about three days to become sealed from water. Your bandage should not get wet or removed. Attempt to avoid showing for three days after surgery and try a sponge bath instead. It can be dangerous showering while standing on only one foot so be careful. Consider  borrowing a shower chair. If the bandage does get wet, call us immediately for a dressing change as this can slow the healing process or cause infection.    External pins need to be kept dry without ointment or moisture. Keep them covered at all times. Protect them from clothing, blankets, and pets.  Any change or movement of the pins deserves a call to clinic. A loose pin will need to be removed. Never push them back into your foot.    Stiffness will develop after any operation due to scarring. The scar tissue begins to form immediately after te surgery. Inactivity can cause excess stiffness and may lead to blood clots, scar tissue, and adhesions.    Scar Care  Scarring is an unfortunate but unavoidable part of surgery. Every person scars differently and there is no way to predict how an individual's scar will look. Once the sutures are removed, there are a few things you can do to help minimize the scar tissue formation.    As soon as the skin is incised during surgery, the body is taking steps to prepare for healing. After about three days, the body has sent cells to the incision to begin the healing process. These cells, called fibroblasts, make collagen, a protein in the skin that helps provide strength. Once the skin has been sufficiently strengthened, the sutures are removed. Over the next year, the body synthesizes new collagen and breaks down old collagen to help achieve a strong scar that allows the foot and ankle to function appropriately. This is where patients can help the appearance of the scare, as it will change over the next year.    1. Do not expose the scar to the sun for one year.  2. Wear shoes/socks or cover your scar with zinc oxide  3. Any sun exposure can permanently darken the scar  4. Massage the scar 2-3 times a day to break down the collagen  5. Apply lotion/Vitamin E on the scar using some pressure  6. Try over the counter products like Mederma/Scar Zone      IF YOU HAVE ANY QUESTIONS  ON THIS MATERIAL, PLEASE DO NOT HESITATE TO CONTACT OUR CLINIC. THANK YOU!    BODY MASS INDEX (BMI)   Many things can cause foot and ankle problems. Foot structure, activity level, foot mechanics and injuries are common causes of pain.    One very important issue that often goes unmentioned, is body weight.  Extra weight can cause increased stress on muscles, ligaments, bones and tendons. Sometimes just a few extra pounds is all it takes to put one over her/his threshold. Without reducing that stress, it can be difficult to alleviate pain.      Some people are uncomfortable addressing this issue, but we feel it is important for you to think about it. As Foot & Ankle specialists, our job is addressing the lower extremity problem and possible causes.     Regarding extra body weight, we encourage patients to discuss diet and weight management plans with their primary care doctors. It is this team approach that gives you the best opportunity for pain relief and getting you back on your feet.                  Follow-ups after your visit        Your next 10 appointments already scheduled     Apr 12, 2017   Procedure with Jorge Luis Gary DPM   [facility] (--)    [address]  [facility] [address]   [phone]            Apr 18, 2017  9:15 AM CDT   Return Visit with Jorge Luis Gary DPM   [facility] ([facility])    [address]  [address]  [facility] [address]   [phone]              Who to contact     If you have questions or need follow up information about today's clinic visit or your schedule please contact [facility] directly at [phone].  Normal or non-critical lab and imaging results will be communicated to you by MyChart, letter or phone within 4 business days after the clinic has received the results. If you do not hear from us within 7 days, please contact the clinic through MyChart or phone. If you have  "a critical or abnormal lab result, we will notify you by phone as soon as possible.  Submit refill requests through [employer] or call your pharmacy and they will forward the refill request to us. Please allow 3 business days for your refill to be completed.          Additional Information About Your Visit        [employer] Information     [employer] lets you send messages to your doctor, view your test results, renew your prescriptions, schedule appointments and more. To sign up, go to [email or web address] . Click on \"Log in\" on the left side of the screen, which will take you to the Welcome page. Then click on \"Sign up Now\" on the right side of the page.     You will be asked to enter the access code listed below, as well as some personal information. Please follow the directions to create your username and password.     Your access code is: [email or web address]  Expires: 2017 12:00 PM     Your access code will  in 90 days. If you need help or a new code, please call your [facility] clinic or [phone].        Care EveryWhere ID     This is your Care EveryWhere ID. This could be used by other organizations to access your [facility] medical records  [id number]        Your Vitals Were     Pulse Height BMI (Body Mass Index)             88 6' 3\" (1.905 m) 36.87 kg/m2          Blood Pressure from Last 3 Encounters:   17 144/87   12 187/106   04 122/98    Weight from Last 3 Encounters:   17 295 lb (133.8 kg)   17 295 lb (133.8 kg)   17 295 lb (133.8 kg)              Today, you had the following     No orders found for display       Primary Care Provider    Physician No Ref-Primary       No address on file        Thank you!     Thank you for choosing [facility]  for your care. Our goal is always to provide you with excellent care. Hearing back from our patients is one way we can continue to improve our services. Please take a few minutes to complete the written survey that " you may receive in the mail after your visit with us. Thank you!             Your Updated Medication List - Protect others around you: Learn how to safely use, store and throw away your medicines at www.disposemymeds.org.          This list is accurate as of: 4/4/17  1:25 PM.  Always use your most recent med list.                   Brand Name Dispense Instructions for use    hydrochlorothiazide 25 MG tablet    HYDRODIURIL    30 tablet    Take 1 tablet by mouth daily.       LISINOPRIL PO          pravastatin 40 MG tablet    PRAVACHOL

## 2017-04-04 NOTE — PATIENT INSTRUCTIONS
DR. TREADWELL'S CLINIC LOCATIONS:       MONDAY - [place]  TUESDAY - [place]   [address] [facility] [address] [facility] [address]   [place] [address] [place] [address]   [phone]       WEDNESDAY - SURGERY THURSDAY AM - [place]   [phone] [address]    [place] [id number] [phone]       THURSDAY PM - [place] FRIDAY AM - [place]   [address] [facility] [address] [place] [address] [place] [address]   [phone]       APPT SCHEDULING: SEND FAXES TO:   [phone]     Foot & Ankle Surgical Risks  Undergoing surgical procedure involves a certain amount of risks. Risks of complications vary, depending on the complexity of the surgery and how you take care of the surgical area during the healing process. Complications can range from minor infection to death. Some complications are temporary while others will be permanent. Your surgeon weighs the risk of complications versus the potential benefit of undergoing the procedure. You need to consider your tolerance for unexpected problems as you decide whether to undergo surgery.    Foot and ankle surgery involves cutting skin, bone, ligaments, blood vessels, and joints. These structures heal well but not without consequence. Any break in the skin can lead to infection. A deep infection can involve bone or joints, which can be devastating. Deep infection can lead to further surgeries such as amputation or could spread to other parts of the body. Most infections are minor and easily treated with oral antibiotics. Infections are often times from bacteria already present on your skin. Proper care of the surgical site is an essential component of avoiding infection. Do not get the bandage wet and take proper care of external pins to avoid these complications.    Joint stiffness is inherent to any foot or ankle surgery. Joint surgery is a major component of reconstructive foot and ankle  procedures. The ligaments and tissues around the joint release for exposure and/or correction of alignment. Scar tissue limits joint mobility. This can lead to hypersensitivity to touch, pain, problems wearing shoes, and need for revision surgery.    Bones do not always heal after surgery. Poor healing after a bone cut of joint fusion can lead to an extended period of casting, bone stimulators, or repeat surgery. A surgical nonunion is when bones do not heal properly. Smoking will almost always increase your risks of having postoperative complications. So if you smoke, quit now.    Bone grafting is sometimes necessary during the original or repeat surgery. Bone is sometimes taken from other parts of the body, freeze-dried cadaveric bone, or synthetic bone grafts may be used as needed.    Blood Clot Prevention and Education  Foot and ankle surgery can lead to blood clots in the large veins of your legs. This is called a deep venous thrombosis or DVT. A DVT can be life threatening if a portion of the clot breaks away and travels to the lungs. A clot in the lungs is called a pulmonary embolism or PE.    Your risk of developing a DVT is dependent on many factors. Risk factors associated with surgery include the type of surgery you are having (foot and ankle surgery is considered a much lower risk that knee, hip, or abdominal surgery), how long you are in a cast/boot, restricted ambulation, inability to move the ankle, and if you are hospitalized after surgery.    A number of medical conditions also increase your risk of DVT, including diabetes, use of estrogen medications such as birth control pills or postmenopausal medications, obesity, pregnancy, heart failure, cancer, etc.    Other risk factors include heavy smoking, advanced age (over 60 years old, but even those over 40 have increased risk), family of personal history of blood clots or clotting disorders.    Symptoms of a DVT in the leg may include swelling,  tenderness, a warm feeling or redness. A DVT can occur in both legs even if only one side is being treated. If you have these symptoms, call your doctor immediately.    Symptoms of a PE include chest pain, shortness of breath or the need to breath rapidly that is not associated with exercise, difficulty breathing, rapid heart rate, a feeling of passing out, and coughing or coughing up blood. A PE is an emergency so you need to be evaluated in the ER immediately if any of these symptoms occur. You could die from a PE. Call 911 right away. PE and DVT can occur without any symptoms in the leg and chest.    Prevention of DVT and PE is important. Your doctor may apply various types of compression to your legs before and after surgery. In addition, high risk patients may be place on a short course of blood thinning medication after surgery.    You can reduce you risk for DVT after surgery by getting up and moving/crawling/crutching around the house once or twice each hour while awake in the first few weeks. Seated range of motion exercises of your ankle and leg may also help. Moving your legs keeps blood flowing through your leg veins and reduced any pooling of blood that may clot. Be sure to follow your doctor's instructions on elevation and weightbearing restrictions.      Surgical Dressing  Your surgical dressing is a sterile dressing and should be left in place until removed by your doctor or their assistant at your first postop visit in clinic. Keep the dressing dry by covering it with a plastic bag during showers, taking baths with your surgical foot hanging outside of the tub, or by sponge bathing. If the dressing does become wet or dirty, call the clinic as it most likely needs to be changed. Do not change it yourself unless told otherwise by your surgeon. The safest plan is to wait to shower for three days after surgery. So take a long shower the morning of surgery.    Do not wear regular shoes with a surgical  bandage and/or external pins in your foot. Wear loose fitting clothing that will easily slide over the dressing. Do not cover your surgical foot with blankets as it can contaminate the dressing. Also, remember to keep it away from your pets as they may try to chew on it.    If your surgeon places external pins in your foot, you must keep your foot dry until the pins are removed at six to eight weeks after surgery. pins should be covered for protection but examine the pin sites for loosening, movement, infection, or drainage whenever possible. Do not apply ointment on the pin sites and never push a loose pin back into place.    PreSurgical Medication Recommendations  Certain prescription, over-the-counter and herbal medications interfere with healing after an operation. The main concern related to medications that increase bleeding at the surgical site. Excess blood under the incision results in poor wound healing, excess pain, increased scarring, and a higher risk of infection.    Some medication slow the healing process of bone. Medications can also interfere with anesthesia drugs that keep you asleep during the operation. It is important to ensure that these medications are out of your system prior to the operation. The list below details a number of medications that are of concern. Pay special attention to how long you should avoid these medications prior to surgery. Please note that this list is not complete. You should ask your surgeon, pharmacist, or primary care physician if you are uncertain about other medications. Any herbal supplement not listed should be discontinued at least one week prior to surgery.    Aspirin: stop one week prior and may restart the day after surgery. This medication promotes bleeding.    Motrin/Ibuprofen/Aleve/Advil/NSAIDS: stop one week prior to surgery. These medications promote bleeding and may delay bone healing. Avoid these medications at least six weeks postop.    Coumadin:  your primary care doctor will manage your coumadin in relation to surgery.    Enbrel: stop two weeks prior and may restart two weeks after. It may affect soft tissue healing and increase infection risk.    Remicade: stop eight to twelve weeks prior and may restart two weeks after. It can affect soft tissue healing and increase infection risk.    Humera: stop four weeks prior and may restart two weeks after. It can affect soft tissue healing and increase infection risk.  Methotrexate: stop taking on dose prior to surgery. It may be restarted when the wound is healing well.    Kava: stop at least one day prior and may restart one day after. It can cause increased sedative effects of anesthetics.    Ephedra (ma quintanilla): stop at least one day prior and may restart one day after. It can increase risk of heart attack or stroke and bleeding at the surgical site.    [place]'s Wort: stop at least five days prior and may restart one day after. It can diminish the effects of medications given during surgery.    Ginseng: stop at least one week prior and may restart one day after. It lowers blood sugar and can increase bleeding at the surgical site.    Ginkgo: stop 36 hours prior and may restart one day after. It can increase bleeding at the surgical site.    Garlic: stop at least one week prior and may restart one day after.    Valerian: slowly decrease the dose over a few weeks prior to avoid withdrawal symptoms. It can increase sedative effects of anesthetics.    Echinacea: no stop/start date. It can be associated with allergic reactions and suppression of your immune system.    Vitamin E/Omgea-3/Fish Oil/Flax/Glucosamine/Chondroitin: stop two weeks prior and may restart one day after. They can increase bleeding at the surgical site.        Tips for Successful PostOperative Healing  How you care for your surgical site is critically important to achieve successful results. Avoidance of injury, infection, excess swelling, scar  tissue, and stiffness are completely dependent on the care you provide over the next six weeks after surgery.    Your foot requires significant rest and elevation. Sitting for long hours with your foot elevated, however, will create its own problems. Expect muscle aches, back pain, cramps etc. Optimal posture, lumbar support, back exercises, ice, and heat may help with your new aches and pains. DO not apply a heating pad to your foot or leg, as this can worsen pain or swelling. Instead apply ice packs behind the involved knee. Do not apply it directly to the skin.    Narcotic pain medications and inactivity may also cause constipation. Limiting the use of these narcotics will help minimize this problem. The pain medication will not completely alleviate your pain. The purpose of pain pills is to take the edge off and help you get through the first few days. You can substitute extra strength tylenol if pain is more mild. Do not take Tylenol and prescription pain pills at the same time. Do not take more than 4,000mg of Tylenol in 24 hours. You can also minimize constipation by drinking plenty of water, eating lots of fruits and veggies, and taking the appropriate amount of Metamucil or other fiber supplements. These measures should be continued while on narcotic pain medications and if you remain inactive.    Showering can be a major challenge after surgery. Your incision requires about three days to become sealed from water. Your bandage should not get wet or removed. Attempt to avoid showing for three days after surgery and try a sponge bath instead. It can be dangerous showering while standing on only one foot so be careful. Consider borrowing a shower chair. If the bandage does get wet, call us immediately for a dressing change as this can slow the healing process or cause infection.    External pins need to be kept dry without ointment or moisture. Keep them covered at all times. Protect them from clothing,  blankets, and pets.  Any change or movement of the pins deserves a call to clinic. A loose pin will need to be removed. Never push them back into your foot.    Stiffness will develop after any operation due to scarring. The scar tissue begins to form immediately after te surgery. Inactivity can cause excess stiffness and may lead to blood clots, scar tissue, and adhesions.    Scar Care  Scarring is an unfortunate but unavoidable part of surgery. Every person scars differently and there is no way to predict how an individual's scar will look. Once the sutures are removed, there are a few things you can do to help minimize the scar tissue formation.    As soon as the skin is incised during surgery, the body is taking steps to prepare for healing. After about three days, the body has sent cells to the incision to begin the healing process. These cells, called fibroblasts, make collagen, a protein in the skin that helps provide strength. Once the skin has been sufficiently strengthened, the sutures are removed. Over the next year, the body synthesizes new collagen and breaks down old collagen to help achieve a strong scar that allows the foot and ankle to function appropriately. This is where patients can help the appearance of the scare, as it will change over the next year.    1. Do not expose the scar to the sun for one year.  2. Wear shoes/socks or cover your scar with zinc oxide  3. Any sun exposure can permanently darken the scar  4. Massage the scar 2-3 times a day to break down the collagen  5. Apply lotion/Vitamin E on the scar using some pressure  6. Try over the counter products like Mederma/Scar Zone      IF YOU HAVE ANY QUESTIONS ON THIS MATERIAL, PLEASE DO NOT HESITATE TO CONTACT OUR CLINIC. THANK YOU!    BODY MASS INDEX (BMI)   Many things can cause foot and ankle problems. Foot structure, activity level, foot mechanics and injuries are common causes of pain.    One very important issue that often goes  unmentioned, is body weight.  Extra weight can cause increased stress on muscles, ligaments, bones and tendons. Sometimes just a few extra pounds is all it takes to put one over her/his threshold. Without reducing that stress, it can be difficult to alleviate pain.      Some people are uncomfortable addressing this issue, but we feel it is important for you to think about it. As Foot & Ankle specialists, our job is addressing the lower extremity problem and possible causes.     Regarding extra body weight, we encourage patients to discuss diet and weight management plans with their primary care doctors. It is this team approach that gives you the best opportunity for pain relief and getting you back on your feet.

## 2017-04-04 NOTE — PROGRESS NOTES
"Foot & Ankle Surgery   April 4, 2017    S:  Pt is seen today for surgical consult for  Bimalleolar R ankle fracture.  Conservative therapies that have been attempted without sufficient relief include protected NWB.  Patient has been ambulating on the foot.  Because of insufficient relief, the patient wishes to forego further non-operative cares in favor of surgical intervention.  No guarantees have been given to the outcome.      Vitals:    04/04/17 1314   Pulse: 88   Weight: 295 lb (133.8 kg)   Height: 6' 3\" (1.905 m)     Body mass index is 36.87 kg/(m^2).    ROS - Pos for CC.  Patient denies current nausea, vomiting, chills, fevers, belly pain, calf pain, chest pain or SOB.  Complete remainder of ROS is otherwise neg.      PE:  VASC -   Pulses are palpable, CFT minimally delayed  NEURO -   Light touch intact to all sensory nerve distributions without reported paresthesias.  DERM:    Neg for nodules, lesions or ulcerations  MSK:    Mild tenderness at medial and lateral malleoli.  Minimal other discomfort noted.  LYMPH:     Moderate edema noted.  Able to pinch skin medially and laterally  CALF:   Neg for redness, swelling or tenderness.    Imaging:  xrays R ankle 3/18/17 - IMPRESSION: Oblique fracture of the distal fibula. Transverse fracture  of the medial malleolus. Minimal subluxation of the talus laterally.    Assessment:  62 year old male unstable right lower extremity bimalleolar ankle fracture    Plan:  The surgical procedure(s) was discussed in detail today.  Risks that were discussed include, but are not limited to, infection, wound healing complications, temporary or permanent nerve damage/numbness, painful scarring, possible recurrence of/new deformity, over-correction, under-correction, possible abnormal bone healing, fixation/hardware failure, continued or new pain, the need to revise the procedure, as well as blood clots, limb loss, pain syndrome and death.  After a discussion of the procedure, risks, " and post-operative care and course, the patient has elected to forego further non-operative management in favor of surgical intervention.  No guarantees were given.      Diagnosis:  Bimalleolar ankle fracture  Procedure:  Open reduction internal fixation right ankle fracture  Activity Level:  NWB  Pain management:  Percocet/Vistaril  DVT prophylaxis:  [id number] BID; mechanical exercises/compression; early frequent ambulation.  Discussed pros/cons/risks of Lovenox, patient declined  Allergies:     Allergies   Allergen Reactions     No Known Allergies      Dispo:  Same day  WB assistive device:  Has crutches at home; Rx for Rollabout 4/4/17  Smoking:  neg  Clot/bleeding disorder history:   neg  Job duties/anticipated time off:  Anticipate 10+ weeks off    Billing today is based on a time of >25 minutes, more than 50% of which was spent on counseling and coordinating care.    Body mass index is 36.87 kg/(m^2).  Weight management plan: Patient was referred to their PCP to discuss a diet and exercise plan.      Jorge Luis Gary DPM   Podiatric Foot & Ankle Surgeon  [facility]  [phone]

## 2017-04-04 NOTE — NURSING NOTE
"Chief Complaint   Patient presents with     Schedule Surgery     DOS 4/12/17 R ankle       Initial Pulse 88  Ht 6' 3\" (1.905 m)  Wt 295 lb (133.8 kg)  BMI 36.87 kg/m2 Estimated body mass index is 36.87 kg/(m^2) as calculated from the following:    Height as of this encounter: 6' 3\" (1.905 m).    Weight as of this encounter: 295 lb (133.8 kg).  Medication Reconciliation: complete  "

## 2017-04-12 ENCOUNTER — ANESTHESIA (OUTPATIENT)
Dept: SURGERY | Facility: CLINIC | Age: 62
End: 2017-04-12
Payer: OTHER MISCELLANEOUS

## 2017-04-12 ENCOUNTER — APPOINTMENT (OUTPATIENT)
Dept: GENERAL RADIOLOGY | Facility: CLINIC | Age: 62
End: 2017-04-12
Attending: PODIATRIST
Payer: OTHER MISCELLANEOUS

## 2017-04-12 ENCOUNTER — HOSPITAL ENCOUNTER (OUTPATIENT)
Facility: CLINIC | Age: 62
Discharge: HOME | End: 2017-04-12
Attending: PODIATRIST | Admitting: PODIATRIST
Payer: OTHER MISCELLANEOUS

## 2017-04-12 ENCOUNTER — ANESTHESIA EVENT (OUTPATIENT)
Dept: SURGERY | Facility: CLINIC | Age: 62
End: 2017-04-12
Payer: OTHER MISCELLANEOUS

## 2017-04-12 VITALS
DIASTOLIC BLOOD PRESSURE: 86 MMHG | TEMPERATURE: 97.5 F | RESPIRATION RATE: 15 BRPM | HEIGHT: 76 IN | SYSTOLIC BLOOD PRESSURE: 136 MMHG | OXYGEN SATURATION: 94 % | BODY MASS INDEX: 37.99 KG/M2 | WEIGHT: 312 LBS

## 2017-04-12 DIAGNOSIS — S82.841A BIMALLEOLAR ANKLE FRACTURE, RIGHT, CLOSED, INITIAL ENCOUNTER: ICD-10-CM

## 2017-04-12 DIAGNOSIS — Z87.81 STATUS POST ORIF OF FRACTURE OF ANKLE: Primary | ICD-10-CM

## 2017-04-12 DIAGNOSIS — Z98.890 STATUS POST ORIF OF FRACTURE OF ANKLE: Primary | ICD-10-CM

## 2017-04-12 PROCEDURE — 25000125 ZZHC RX 250: Performed by: NURSE ANESTHETIST, CERTIFIED REGISTERED

## 2017-04-12 PROCEDURE — 71000013 ZZH RECOVERY PHASE 1 LEVEL 1 EA ADDTL HR: Performed by: PODIATRIST

## 2017-04-12 PROCEDURE — 25000128 H RX IP 250 OP 636: Performed by: ANESTHESIOLOGY

## 2017-04-12 PROCEDURE — 27814 TREATMENT OF ANKLE FRACTURE: CPT | Mod: RT | Performed by: PODIATRIST

## 2017-04-12 PROCEDURE — 36000065 ZZH SURGERY LEVEL 4 W FLUORO 1ST 30 MIN: Performed by: PODIATRIST

## 2017-04-12 PROCEDURE — 36000063 ZZH SURGERY LEVEL 4 EA 15 ADDTL MIN: Performed by: PODIATRIST

## 2017-04-12 PROCEDURE — 25000125 ZZHC RX 250: Performed by: ANESTHESIOLOGY

## 2017-04-12 PROCEDURE — 37000009 ZZH ANESTHESIA TECHNICAL FEE, EACH ADDTL 15 MIN: Performed by: PODIATRIST

## 2017-04-12 PROCEDURE — C1713 ANCHOR/SCREW BN/BN,TIS/BN: HCPCS | Performed by: PODIATRIST

## 2017-04-12 PROCEDURE — 71000027 ZZH RECOVERY PHASE 2 EACH 15 MINS: Performed by: PODIATRIST

## 2017-04-12 PROCEDURE — 27210794 ZZH OR GENERAL SUPPLY STERILE: Performed by: PODIATRIST

## 2017-04-12 PROCEDURE — 71000012 ZZH RECOVERY PHASE 1 LEVEL 1 FIRST HR: Performed by: PODIATRIST

## 2017-04-12 PROCEDURE — 27211024 ZZHC OR SUPPLY OTHER OPNP: Performed by: PODIATRIST

## 2017-04-12 PROCEDURE — 25000128 H RX IP 250 OP 636: Performed by: NURSE ANESTHETIST, CERTIFIED REGISTERED

## 2017-04-12 PROCEDURE — 40000277 XR SURGERY CARM FLUORO LESS THAN 5 MIN W STILLS

## 2017-04-12 PROCEDURE — 25000566 ZZH SEVOFLURANE, EA 15 MIN: Performed by: PODIATRIST

## 2017-04-12 PROCEDURE — 25800025 ZZH RX 258: Performed by: NURSE ANESTHETIST, CERTIFIED REGISTERED

## 2017-04-12 PROCEDURE — 37000008 ZZH ANESTHESIA TECHNICAL FEE, 1ST 30 MIN: Performed by: PODIATRIST

## 2017-04-12 PROCEDURE — 40000170 ZZH STATISTIC PRE-PROCEDURE ASSESSMENT II: Performed by: PODIATRIST

## 2017-04-12 PROCEDURE — 40000940 XR ANKLE PORT RT 2 VW: Mod: RT

## 2017-04-12 PROCEDURE — 27210995 ZZH RX 272: Performed by: PODIATRIST

## 2017-04-12 PROCEDURE — 25000128 H RX IP 250 OP 636: Performed by: PODIATRIST

## 2017-04-12 DEVICE — IMPLANTABLE DEVICE: Type: IMPLANTABLE DEVICE | Site: LEG | Status: FUNCTIONAL

## 2017-04-12 RX ORDER — ONDANSETRON 2 MG/ML
4 INJECTION INTRAMUSCULAR; INTRAVENOUS EVERY 30 MIN PRN
Status: DISCONTINUED | OUTPATIENT
Start: 2017-04-12 | End: 2017-04-12 | Stop reason: HOSPADM

## 2017-04-12 RX ORDER — MEPERIDINE HYDROCHLORIDE 25 MG/ML
12.5 INJECTION INTRAMUSCULAR; INTRAVENOUS; SUBCUTANEOUS
Status: DISCONTINUED | OUTPATIENT
Start: 2017-04-12 | End: 2017-04-12 | Stop reason: HOSPADM

## 2017-04-12 RX ORDER — CEFAZOLIN SODIUM 1 G/50ML
3 SOLUTION INTRAVENOUS
Status: COMPLETED | OUTPATIENT
Start: 2017-04-12 | End: 2017-04-12

## 2017-04-12 RX ORDER — ONDANSETRON 2 MG/ML
INJECTION INTRAMUSCULAR; INTRAVENOUS PRN
Status: DISCONTINUED | OUTPATIENT
Start: 2017-04-12 | End: 2017-04-12

## 2017-04-12 RX ORDER — FENTANYL CITRATE 50 UG/ML
25-50 INJECTION, SOLUTION INTRAMUSCULAR; INTRAVENOUS
Status: DISCONTINUED | OUTPATIENT
Start: 2017-04-12 | End: 2017-04-12 | Stop reason: HOSPADM

## 2017-04-12 RX ORDER — CEFAZOLIN SODIUM 1 G/3ML
1 INJECTION, POWDER, FOR SOLUTION INTRAMUSCULAR; INTRAVENOUS SEE ADMIN INSTRUCTIONS
Status: DISCONTINUED | OUTPATIENT
Start: 2017-04-12 | End: 2017-04-12 | Stop reason: HOSPADM

## 2017-04-12 RX ORDER — FENTANYL CITRATE 50 UG/ML
INJECTION, SOLUTION INTRAMUSCULAR; INTRAVENOUS PRN
Status: DISCONTINUED | OUTPATIENT
Start: 2017-04-12 | End: 2017-04-12

## 2017-04-12 RX ORDER — AMOXICILLIN 250 MG
CAPSULE ORAL
Qty: 30 TABLET | Refills: 0 | Status: SHIPPED | OUTPATIENT
Start: 2017-04-12 | End: 2017-05-01

## 2017-04-12 RX ORDER — PHYSOSTIGMINE SALICYLATE 1 MG/ML
1.2 INJECTION INTRAVENOUS
Status: DISCONTINUED | OUTPATIENT
Start: 2017-04-12 | End: 2017-04-12 | Stop reason: HOSPADM

## 2017-04-12 RX ORDER — KETOROLAC TROMETHAMINE 30 MG/ML
30 INJECTION, SOLUTION INTRAMUSCULAR; INTRAVENOUS ONCE
Status: DISCONTINUED | OUTPATIENT
Start: 2017-04-12 | End: 2017-04-12 | Stop reason: HOSPADM

## 2017-04-12 RX ORDER — MAGNESIUM HYDROXIDE 1200 MG/15ML
LIQUID ORAL PRN
Status: DISCONTINUED | OUTPATIENT
Start: 2017-04-12 | End: 2017-04-12 | Stop reason: HOSPADM

## 2017-04-12 RX ORDER — SODIUM CHLORIDE, SODIUM LACTATE, POTASSIUM CHLORIDE, CALCIUM CHLORIDE 600; 310; 30; 20 MG/100ML; MG/100ML; MG/100ML; MG/100ML
INJECTION, SOLUTION INTRAVENOUS CONTINUOUS PRN
Status: DISCONTINUED | OUTPATIENT
Start: 2017-04-12 | End: 2017-04-12

## 2017-04-12 RX ORDER — PROPOFOL 10 MG/ML
INJECTION, EMULSION INTRAVENOUS PRN
Status: DISCONTINUED | OUTPATIENT
Start: 2017-04-12 | End: 2017-04-12

## 2017-04-12 RX ORDER — SODIUM CHLORIDE, SODIUM LACTATE, POTASSIUM CHLORIDE, CALCIUM CHLORIDE 600; 310; 30; 20 MG/100ML; MG/100ML; MG/100ML; MG/100ML
INJECTION, SOLUTION INTRAVENOUS CONTINUOUS
Status: DISCONTINUED | OUTPATIENT
Start: 2017-04-12 | End: 2017-04-12 | Stop reason: HOSPADM

## 2017-04-12 RX ORDER — ONDANSETRON 4 MG/1
4 TABLET, ORALLY DISINTEGRATING ORAL EVERY 30 MIN PRN
Status: DISCONTINUED | OUTPATIENT
Start: 2017-04-12 | End: 2017-04-12 | Stop reason: HOSPADM

## 2017-04-12 RX ORDER — FENTANYL CITRATE 50 UG/ML
25-50 INJECTION, SOLUTION INTRAMUSCULAR; INTRAVENOUS EVERY 5 MIN PRN
Status: DISCONTINUED | OUTPATIENT
Start: 2017-04-12 | End: 2017-04-12 | Stop reason: HOSPADM

## 2017-04-12 RX ORDER — OXYCODONE HYDROCHLORIDE 5 MG/1
5-10 TABLET ORAL
Status: DISCONTINUED | OUTPATIENT
Start: 2017-04-12 | End: 2017-04-12 | Stop reason: HOSPADM

## 2017-04-12 RX ORDER — PROPOFOL 10 MG/ML
INJECTION, EMULSION INTRAVENOUS CONTINUOUS PRN
Status: DISCONTINUED | OUTPATIENT
Start: 2017-04-12 | End: 2017-04-12

## 2017-04-12 RX ORDER — OXYCODONE AND ACETAMINOPHEN 5; 325 MG/1; MG/1
TABLET ORAL
Qty: 50 TABLET | Refills: 0 | Status: SHIPPED | OUTPATIENT
Start: 2017-04-12 | End: 2017-05-01

## 2017-04-12 RX ORDER — NALOXONE HYDROCHLORIDE 0.4 MG/ML
.1-.4 INJECTION, SOLUTION INTRAMUSCULAR; INTRAVENOUS; SUBCUTANEOUS
Status: DISCONTINUED | OUTPATIENT
Start: 2017-04-12 | End: 2017-04-12 | Stop reason: HOSPADM

## 2017-04-12 RX ORDER — LIDOCAINE HYDROCHLORIDE 20 MG/ML
INJECTION, SOLUTION INFILTRATION; PERINEURAL PRN
Status: DISCONTINUED | OUTPATIENT
Start: 2017-04-12 | End: 2017-04-12

## 2017-04-12 RX ORDER — HYDROXYZINE HYDROCHLORIDE 25 MG/1
TABLET, FILM COATED ORAL
Qty: 50 TABLET | Refills: 0 | Status: SHIPPED | OUTPATIENT
Start: 2017-04-12 | End: 2017-05-01

## 2017-04-12 RX ADMIN — SODIUM CHLORIDE, POTASSIUM CHLORIDE, SODIUM LACTATE AND CALCIUM CHLORIDE: 600; 310; 30; 20 INJECTION, SOLUTION INTRAVENOUS at 12:21

## 2017-04-12 RX ADMIN — FENTANYL CITRATE 50 MCG: 50 INJECTION, SOLUTION INTRAMUSCULAR; INTRAVENOUS at 14:16

## 2017-04-12 RX ADMIN — MIDAZOLAM HYDROCHLORIDE 2 MG: 1 INJECTION, SOLUTION INTRAMUSCULAR; INTRAVENOUS at 11:11

## 2017-04-12 RX ADMIN — PROPOFOL 180 MCG/KG/MIN: 10 INJECTION, EMULSION INTRAVENOUS at 11:11

## 2017-04-12 RX ADMIN — Medication 1 G: at 13:34

## 2017-04-12 RX ADMIN — MIDAZOLAM HYDROCHLORIDE 2 MG: 1 INJECTION, SOLUTION INTRAMUSCULAR; INTRAVENOUS at 10:40

## 2017-04-12 RX ADMIN — ONDANSETRON 4 MG: 2 INJECTION INTRAMUSCULAR; INTRAVENOUS at 12:12

## 2017-04-12 RX ADMIN — SODIUM CHLORIDE, POTASSIUM CHLORIDE, SODIUM LACTATE AND CALCIUM CHLORIDE: 600; 310; 30; 20 INJECTION, SOLUTION INTRAVENOUS at 10:30

## 2017-04-12 RX ADMIN — LIDOCAINE HYDROCHLORIDE 60 MG: 20 INJECTION, SOLUTION INFILTRATION; PERINEURAL at 11:11

## 2017-04-12 RX ADMIN — Medication 3 G: at 11:15

## 2017-04-12 RX ADMIN — FENTANYL CITRATE 25 MCG: 50 INJECTION, SOLUTION INTRAMUSCULAR; INTRAVENOUS at 12:25

## 2017-04-12 RX ADMIN — FENTANYL CITRATE 25 MCG: 50 INJECTION, SOLUTION INTRAMUSCULAR; INTRAVENOUS at 12:56

## 2017-04-12 RX ADMIN — PROPOFOL: 10 INJECTION, EMULSION INTRAVENOUS at 12:18

## 2017-04-12 RX ADMIN — ROPIVACAINE HYDROCHLORIDE 50 ML: 5 INJECTION, SOLUTION EPIDURAL; INFILTRATION; PERINEURAL at 10:40

## 2017-04-12 RX ADMIN — FENTANYL CITRATE 50 MCG: 50 INJECTION, SOLUTION INTRAMUSCULAR; INTRAVENOUS at 11:11

## 2017-04-12 RX ADMIN — PROPOFOL 300 MG: 10 INJECTION, EMULSION INTRAVENOUS at 11:11

## 2017-04-12 NOTE — IP AVS SNAPSHOT
MRN:[id number]                      After Visit Summary   4/12/2017    Kaiser Sylvester    MRN: [id number]           Thank you!     Thank you for choosing [facility] for your care. Our goal is always to provide you with excellent care. Hearing back from our patients is one way we can continue to improve our services. Please take a few minutes to complete the written survey that you may receive in the mail after you visit with us. Thank you!        Patient Information     Date Of Birth          1955        About your hospital stay     You were admitted on:  April 12, 2017 You last received care in the:  [facility] Same Day Surgery    You were discharged on:  April 12, 2017       Who to Call     For medical emergencies, please call 911.  For non-urgent questions about your medical care, please call your primary care provider or clinic, None  For questions related to your surgery, please call your surgery clinic        Attending Provider     Provider Jorge Luis Salomon DPM Podiatry       Primary Care Provider    Physician No Ref-Primary       No address on file        After Care Instructions     Activity       1.  Perform the following activities every 2 hours x 20 minutes:  -ankle ROM/calf massaging bilateral lower extremity.  If you are not comfortable moving the surgical ankle, you can wiggle the toes on that foot  -deep breathing/coughing exercises  -ambulation; keep in mind your weightbearing restrictions    2.  Take 2 baby aspirin (81mg) daily, starting the day after surgery, until you are back to regular shoe gear.  You do not need to take the baby aspirin if you do not tolerate aspirin products.            Discharge Instructions       Review discharge instructions as directed by Provider.            Discharge Instructions       Follow up with Jorge Luis at pre-scheduled post-operative appointment next week.  Please call prior to appointment with any questions/concerns.             Elevate affected extremity       Elevate surgical limb above hip level 23/24 hours per day for aggressive swelling control.  This is mostly for the first 2 weeks after surgery            Ice to affected area       Apply ice pack to surgical site and behind right knee every 2 hours x 20 minutes.  Do not apply ice pack directly to skin.            No Alcohol       for 24 hours post-procedure            No dressing change       until follow up clinic appointment.            No driving or operating machinery       until the day after procedure            No weight bearing                 Your next 10 appointments already scheduled     Apr 18, 2017  9:15 AM CDT   Return Visit with Jorge Luis Gary DPM   [facility] ([facility])    [address]  [address]  [facility] [address]   [phone]              Further instructions from your care team       Same Day Surgery Discharge Instructions for  Sedation and General Anesthesia       It's not unusual to feel dizzy, light-headed or faint for up to 24 hours after surgery or while taking pain medication.  If you have these symptoms: sit for a few minutes before standing and have someone assist you when you get up to walk or use the bathroom.      You should rest and relax for the next 24 hours. We recommend you make arrangements to have an adult stay with you for at least 24 hours after your discharge.  Avoid hazardous and strenuous activity.      DO NOT DRIVE any vehicle or operate mechanical equipment for 24 hours following the end of your surgery.  Even though you may feel normal, your reactions may be affected by the medication you have received.      Do not drink alcoholic beverages for 24 hours following surgery.       Slowly progress to your regular diet as you feel able. It's not unusual to feel nauseated and/or vomit after receiving anesthesia.  If you develop these symptoms, drink clear liquids (apple juice, ginger ale,  broth, 7-up, etc. ) until you feel better.  If your nausea and vomiting persists for 24 hours, please notify your surgeon.        All narcotic pain medications, along with inactivity and anesthesia, can cause constipation. Drinking plenty of liquids and increasing fiber intake will help.      For any questions of a medical nature, call your surgeon.      Do not make important decisions for 24 hours.      If you had general anesthesia, you may have a sore throat for a couple of days related to the breathing tube used during surgery.  You may use Cepacol lozenges to help with this discomfort.  If it worsens or if you develop a fever, contact your surgeon.       If you feel your pain is not well managed with the pain medications prescribed by your surgeon, please contact your surgeon's office to let them know so they can address your concerns.     Discharge Instructions: Using an   Incentive Spirometer    An incentive spirometer is a device that helps you do deep breathing exercises. These exercises will help you breathe better and improve the function of your lungs. The incentive spirometer provides a way for you to take an active part in your recovery.  Follow these steps to use your incentive spirometer:  Inhale normally. Relax and breathe out.  Place your lips tightly around the mouthpiece.  Make sure the device is upright and not tilted.  Breathe in slowly and deeply. Fill your lungs with as much air as you can.   Hold your breath long enough to keep the disk raised for at least 3 seconds while keeping the bead on the right side between the two arrows.   Perform this exercise 10 times every hour while you re awake or as often as your doctor instructs.  If you were also taught coughing exercises, perform them regularly as instructed.                  [facility]      DISCHARGE INSTRUCTIONS FOLLOWING   REGIONAL BLOCK ANESTHESIA      Numbness or lack of feeling in the arm/leg that was operated may last up  "to 24 hours.  The average time is usually 10-15 hours.  You may not be able to lift or move the arm or leg where the operation was by itself during that time.  Long-acting local anesthetic medicines were used to give you long-lasting pain relief.    Wear a sling until your arm is completely \"awake\"    Avoid bumping your arm, leg or foot while it is numb    Avoid extremes of hot or cold while it is numb    Remain quiet and restful the day of surgery.  Resume normal activities gradually over the next day or so as advise by your surgeon.    Do not drive or operate  Any machinery until your extremity is full  \"awake\"        You will have a tingling and prickly sensation in your arm/leg as the feeling begins to return; you can also expect some discomfort. The amount of discomfort is unpredictable, but if you have more pain that can be controlled with the pain medication you received, you should contact your surgeon.  Start to take your pain pills as soon as you start to feel any discomfort or pain.  We strongly recommend starting your pain medication at bedtime if you haven't already done so.  This is in the event that the block wears off while you are asleep.        Crutch Instructions      Because of your surgery you will need crutches.  Make sure you tell your healthcare provider if crutches do not seem to work for you.  Using Crutches   Crutches are the most commonly used device for injuries to the lower part of the body because they allow the most mobility. All walking assistance devices require strength in your upper body but crutches require more coordination. If you are unable to use crutches then a cane or walker may be better.   Remember these rules when using crutches:   Always look straight ahead when you walk. Do not look down.   Hold the top padded part of the crutches into your chest near your armpits. Grasp the padded hand  and always support your weight with your arms and hands.   Do not lean on the " "crutches with your armpit as this could cause nerve damage.  Standing Up  Make sure you are in a stable chair. Move forward to the edge of the chair so that your  good  foot is flat on the floor. Put both crutches together and hold the handgrips in one hand. Stretch the injured leg out straight and then use the crutches with one hand and the chair armrest with the other hand to push yourself into a standing position onto your  good  leg. Once you are standing, wait until you are steady before placing a crutch in each hand. Until you become good at standing, have someone assist you in case you lose your balance. When standing still, lean slightly forward with the crutches ahead of you and about 3 feet apart. Unless you are told otherwise, you should keep weight off your injured leg.  Walking  To begin crutch walking, balance yourself on your  good  leg. Move both crutches forward about the length of one step and place them firmly on the floor in front of you about 3 feet apart. Support your weight on the padded hand rests while leaning forward. Press the top of the crutches against your chest wall and not into your armpits. Be sure to hold the injured leg up off of the floor. When ready, swing the \"good\" leg forward about one step length past the crutches while supporting your weight on the padded hand . Be careful not to go too far. Transfer your weight onto the \"good\" leg then bring both crutches forward about the length of one step. Repeating this motion will allow you to move fairly quickly without the use of your injured leg. Keep practicing until you become good at crutch walking.  Sitting Down  Make sure the chair you are about to sit on is stable. Walk in front of the chair such that you are facing away from it. Move back a little at a time until the back of your  good  leg is nearly touching the seat. Keeping your weight on the  good  leg, move both crutches to one hand holding onto the handgrips. Lean " "forward, bend your  good  knee, and move your injured leg out forward. Sit down slowly while using your free hand to reach for the chair s armrest for support. Place the crutches where you can easily get them. Never pivot, even on your  good  leg. This could cause you to fall or injure your  good  leg.  Navigating Stairs, Curbs & Door Steps  Only attempt this once you are very comfortable with regular crutch walking and only when someone is there to steady you should you begin to lose your balance. Hold on to a  railing with one hand and both crutches in the other hand or have someone carry the loose crutch for you. It does not matter which side the handrail is on. If there is no handrail, you can use both crutches. Using only crutches is the same as the railing method but maintaining your balance can be difficult. The crutch-only method is not recommended until you have become very good at crutch walking. Be sure to only take one step at a time. A simple rule to remember for crutches when navigating stairs or curbs: up with the  good  leg and down with the  bad .  Going Up Stairs or Curbs  Walk close to the first step with the crutches slightly behind you. Push down on the handrail and the crutch and step up with the \"good\" leg. You may have to make a short hop to do this if you are not allowed to place any weight on the injured leg. Lean forward and bring the injured leg and the crutch up beside the \"good\" leg. Repeat this until you have climbed up all of the steps. Remember, the \"good\" leg goes up first and the crutches move with the injured leg. The person helping you should stand behind and to your side that is away from the railing.  Going Down Stairs or Curbs  Walk to the edge of the first step. While standing and balancing on the  good  leg, place both crutches in one hand and then down on the step below. Be sure to support your weight by leaning on the crutches and handrail. Bring the injured leg " "forward, then the \"good\" leg down to the same step as the crutches. Remember crutches go down first with the injured leg. The person helping you should  front and to your side that is away from the railing.  Sitting Method for Navigating Stairs  A safer way to get up and down stairs is to sit down. To go up steps, sit down on the second or third step. Push with your  good  leg below while pushing up with both arms on the step above to move your bottom to the next step. When you get to the top of the stairs you may need to use the  scooting  method described later to get back up. To go down steps you first need to lower yourself to the floor near the top of the steps. Once seated, support yourself with your  good  leg on the second to next step below, and push with both arms on the step where you are sitting to move your bottom down to the next step. When you reach the bottom, pull yourself up to standing position using your crutches. It is helpful if someone can move your crutches and assist you in getting up and down. With practice it may be possible to manage on your own.  If You Start To Fall  If you start to fall and cannot get your balance, throw the crutches away from you. Try to fall onto your  good  side away from your injury and then break your fall with your arms. If uninjured, you can usually get back up by moving into a sitting position and scooting to a chair. Push with your arms and hands on the chair seat while pushing with the \"good\" leg to get up into the chair. You should not attempt to get back up if you are having significant pain. Call for assistance or dial 911 for an ambulance if necessary.  Safety Tips for Crutch Walking   At first you may want to wear a training belt (or a strong regular belt) so others can assist you.   Do not use crutches if you feel dizzy or drowsy.   Be careful on slick or wet surfaces like a kitchen or bathroom floor, snow, ice, or rainy conditions. " "  Temporarily remove pets, throw rugs or other items from your home that might trip you.   Do not hop around while holding onto furniture.   Wear sneakers or rubber soled shoes that will not easily slip or come off.   Be careful on ramps or slopes as they can be harder to walk up or down   Do not remove any parts from your crutches especially the padding or rubber traction footings. Replace padding or footings that become damaged immediately.   It is important to use your crutches correctly. If you feel any numbness or tingling in your arms, you are probably using the crutches incorrectly.  Helpful Hints   A bedside toilet or toilet riser may be helpful.   Always allow for extra time to get around. Children in school should be given permission to leave class early to avoid crowds when changing classes.   Elevate the injured leg when sitting.   Use a backpack to carry books or waist pouch to carry other items so that both hands are free.   Call your healthcare provider if you have any questions or concerns.                              **If you have questions or concerns about your procedure,   call Dr. Gary at [phone]**          Pending Results     Date and Time Order Name Status Description    4/12/2017 1402 XR Ankle Port Right 2 Views In process     4/12/2017 1345 XR Surgery DONALD L/T 5 Min Fluoro w Stills In process             Admission Information     Date & Time Provider Department Dept. Phone    4/12/2017 Jorge Luis Gary DPM [facility] Same Day Surgery [phone]      Your Vitals Were     Blood Pressure Temperature Respirations Height Weight Pulse Oximetry    136/86 97.5  F (36.4  C) (Temporal) 15 1.918 m (6' 3.5\") 141.5 kg (312 lb) 94%    BMI (Body Mass Index)                   38.48 kg/m2           MyChart Information     [facility] lets you send messages to your doctor, view your test results, renew your prescriptions, schedule appointments and more. To sign up, go to " "[email or web address] . Click on \"Log in\" on the left side of the screen, which will take you to the Welcome page. Then click on \"Sign up Now\" on the right side of the page.     You will be asked to enter the access code listed below, as well as some personal information. Please follow the directions to create your username and password.     Your access code is: [email or web address]  Expires: 2017 12:00 PM     Your access code will  in 90 days. If you need help or a new code, please call your [facility] clinic or [phone].        Care EveryWhere ID     This is your Care EveryWhere ID. This could be used by other organizations to access your [facility] medical records  [id number]           Review of your medicines      START taking        Dose / Directions    hydrOXYzine 25 MG tablet   Commonly known as:  ATARAX   Used for:  Status post ORIF of fracture of ankle, Bimalleolar ankle fracture, right, closed, initial encounter        Take 1-2 tablets every 4-6 hours as needed for pain control.   Quantity:  50 tablet   Refills:  0       oxyCODONE-acetaminophen 5-325 MG per tablet   Commonly known as:  PERCOCET   Used for:  Status post ORIF of fracture of ankle, Bimalleolar ankle fracture, right, closed, initial encounter        Take 1-2 tablets every 4-6 hours as needed for pain.  Do not take other tylenol products with this medication, as too much tylenol can be damaging to the liver.   Quantity:  50 tablet   Refills:  0       senna-docusate 8.6-50 MG per tablet   Commonly known as:  SENOKOT-S;PERICOLACE   Used for:  Status post ORIF of fracture of ankle, Bimalleolar ankle fracture, right, closed, initial encounter        Take 2 tablets daily as needed for constipation while taking prescription pain medications.   Quantity:  30 tablet   Refills:  0         CONTINUE these medicines which have NOT CHANGED        Dose / Directions    GLUCOSAMINE CHONDR COMPLEX PO        Dose:  1 tablet   Take 1 tablet by mouth daily "   Refills:  0       LISINOPRIL PO        Dose:  20 mg   Take 20 mg by mouth daily   Refills:  0       order for DME   Used for:  Closed right ankle fracture, with routine healing, subsequent encounter        Equipment being ordered: RollAbout x 3 months   Quantity:  1 Device   Refills:  0       pravastatin 40 MG tablet   Commonly known as:  PRAVACHOL        Dose:  40 mg   Take 40 mg by mouth daily   Refills:  0         STOP taking     ALEVE PO           aspirin 81 MG EC tablet           IBUPROFEN PO                Where to get your medicines      These medications were sent to [facility] [place] - [address]  [address], [place] [address]     Phone:  [phone]     hydrOXYzine 25 MG tablet    senna-docusate 8.6-50 MG per tablet         Some of these will need a paper prescription and others can be bought over the counter. Ask your nurse if you have questions.     Bring a paper prescription for each of these medications     oxyCODONE-acetaminophen 5-325 MG per tablet                Protect others around you: Learn how to safely use, store and throw away your medicines at www.disposemymeds.org.             Medication List: This is a list of all your medications and when to take them. Check marks below indicate your daily home schedule. Keep this list as a reference.      Medications           Morning Afternoon Evening Bedtime As Needed    GLUCOSAMINE CHONDR COMPLEX PO   Take 1 tablet by mouth daily                                hydrOXYzine 25 MG tablet   Commonly known as:  ATARAX   Take 1-2 tablets every 4-6 hours as needed for pain control.                                LISINOPRIL PO   Take 20 mg by mouth daily                                order for DME   Equipment being ordered: RollAbout x 3 months                                oxyCODONE-acetaminophen 5-325 MG per tablet   Commonly known as:  PERCOCET   Take 1-2 tablets every 4-6 hours as needed for pain.  Do not  take other tylenol products with this medication, as too much tylenol can be damaging to the liver.                                pravastatin 40 MG tablet   Commonly known as:  PRAVACHOL   Take 40 mg by mouth daily                                senna-docusate 8.6-50 MG per tablet   Commonly known as:  SENOKOT-S;PERICOLACE   Take 2 tablets daily as needed for constipation while taking prescription pain medications.

## 2017-04-12 NOTE — ANESTHESIA POSTPROCEDURE EVALUATION
Patient: Kaiser Sylvester    Procedure(s):  RIGHT ANKLE FRACTURE REPAIR - Wound Class: I-Clean    Diagnosis:bimalleolar ankle fracture   Diagnosis Additional Information: No value filed.    Anesthesia Type:  General, LMA, Periph. Nerve Block for postop pain    Note:  Anesthesia Post Evaluation    Patient location during evaluation: PACU  Patient participation: Able to fully participate in evaluation  Level of consciousness: awake  Pain management: adequate  Airway patency: patent  Cardiovascular status: acceptable  Respiratory status: acceptable  Hydration status: acceptable  PONV: controlled     Anesthetic complications: None          Last vitals:  Vitals:    04/12/17 1400 04/12/17 1415 04/12/17 1430   BP: (!) 148/92 129/86 122/80   Resp: 16 24 12   Temp:      SpO2: 96% 93% 92%         Electronically Signed By: Frank Denise MD  April 12, 2017  2:36 PM

## 2017-04-12 NOTE — ANESTHESIA PREPROCEDURE EVALUATION
Anesthesia Evaluation     . Pt has had prior anesthetic.     No history of anesthetic complications          ROS/MED HX    ENT/Pulmonary:     (+)HOLLY risk factors hypertension, obese, , . .    Neurologic:       Cardiovascular:     (+) hypertension----. : . . . :. .       METS/Exercise Tolerance:     Hematologic:         Musculoskeletal:         GI/Hepatic:         Renal/Genitourinary:         Endo:     (+) Obesity (BMI 36), .      Psychiatric:         Infectious Disease:         Malignancy:         Other:                     Physical Exam  Normal systems: cardiovascular and pulmonary    Airway   Mallampati: II  Neck ROM: limited    Dental     Cardiovascular       Pulmonary                     Anesthesia Plan      History & Physical Review  History and physical reviewed and following examination; no interval change.    ASA Status:  3 .    NPO Status:  > 6 hours    Plan for General, LMA and Periph. Nerve Block for postop pain with Intravenous and Propofol induction. Maintenance will be TIVA.    PONV prophylaxis:  Ondansetron (or other 5HT-3) and Dexamethasone or Solumedrol       Postoperative Care  Postoperative pain management:  IV analgesics and Oral pain medications.      Consents  Anesthetic plan, risks, benefits and alternatives discussed with:  Patient..                          .  DPreop diagnosis: bimalleolar ankle fracture   Procedure(s):  OPEN REDUCTION INTERNAL FIXATION ANKLE  Allergies   Allergen Reactions     No Known Allergies        No current facility-administered medications on file prior to encounter.   Current Outpatient Prescriptions on File Prior to Encounter:  LISINOPRIL PO Take 20 mg by mouth daily

## 2017-04-12 NOTE — IP AVS SNAPSHOT
[facility]    [address]    [place] [address]    Phone:  [phone]    Fax:  [phone]                                       After Visit Summary   4/12/2017    Kaiser Sylvester    MRN: [id number]           After Visit Summary Signature Page     I have received my discharge instructions, and my questions have been answered. I have discussed any challenges I see with this plan with the nurse or doctor.    ..........................................................................................................................................  Patient/Patient Representative Signature      ..........................................................................................................................................  Patient Representative Print Name and Relationship to Patient    ..................................................               ................................................  Date                                            Time    ..........................................................................................................................................  Reviewed by Signature/Title    ...................................................              ..............................................  Date                                                            Time

## 2017-04-12 NOTE — BRIEF OP NOTE
[facility] Brief Operative Note    Pre-operative diagnosis: bimalleolar ankle fracture    Post-operative diagnosis sp ORIF right bimalleolar ankle fracture     Procedure: Procedure(s):  RIGHT ANKLE FRACTURE REPAIR - Wound Class: I-Clean   Surgeon(s): Surgeon(s) and Role:     * Jorge Luis Gary DPM - Primary     * Chip Carvajal DPM PGY-3   Estimated blood loss: 5 mL    Specimens: * No specimens in log *   Findings: Good reduction of fractures, stable fixation.  No instability noted on stressing of joint.

## 2017-04-12 NOTE — DISCHARGE INSTRUCTIONS
Same Day Surgery Discharge Instructions for  Sedation and General Anesthesia       It's not unusual to feel dizzy, light-headed or faint for up to 24 hours after surgery or while taking pain medication.  If you have these symptoms: sit for a few minutes before standing and have someone assist you when you get up to walk or use the bathroom.      You should rest and relax for the next 24 hours. We recommend you make arrangements to have an adult stay with you for at least 24 hours after your discharge.  Avoid hazardous and strenuous activity.      DO NOT DRIVE any vehicle or operate mechanical equipment for 24 hours following the end of your surgery.  Even though you may feel normal, your reactions may be affected by the medication you have received.      Do not drink alcoholic beverages for 24 hours following surgery.       Slowly progress to your regular diet as you feel able. It's not unusual to feel nauseated and/or vomit after receiving anesthesia.  If you develop these symptoms, drink clear liquids (apple juice, ginger ale, broth, 7-up, etc. ) until you feel better.  If your nausea and vomiting persists for 24 hours, please notify your surgeon.        All narcotic pain medications, along with inactivity and anesthesia, can cause constipation. Drinking plenty of liquids and increasing fiber intake will help.      For any questions of a medical nature, call your surgeon.      Do not make important decisions for 24 hours.      If you had general anesthesia, you may have a sore throat for a couple of days related to the breathing tube used during surgery.  You may use Cepacol lozenges to help with this discomfort.  If it worsens or if you develop a fever, contact your surgeon.       If you feel your pain is not well managed with the pain medications prescribed by your surgeon, please contact your surgeon's office to let them know so they can address your concerns.     Discharge Instructions: Using an   Incentive  "Spirometer    An incentive spirometer is a device that helps you do deep breathing exercises. These exercises will help you breathe better and improve the function of your lungs. The incentive spirometer provides a way for you to take an active part in your recovery.  Follow these steps to use your incentive spirometer:  Inhale normally. Relax and breathe out.  Place your lips tightly around the mouthpiece.  Make sure the device is upright and not tilted.  Breathe in slowly and deeply. Fill your lungs with as much air as you can.   Hold your breath long enough to keep the disk raised for at least 3 seconds while keeping the bead on the right side between the two arrows.   Perform this exercise 10 times every hour while you re awake or as often as your doctor instructs.  If you were also taught coughing exercises, perform them regularly as instructed.                  [facility]      DISCHARGE INSTRUCTIONS FOLLOWING   REGIONAL BLOCK ANESTHESIA      Numbness or lack of feeling in the arm/leg that was operated may last up to 24 hours.  The average time is usually 10-15 hours.  You may not be able to lift or move the arm or leg where the operation was by itself during that time.  Long-acting local anesthetic medicines were used to give you long-lasting pain relief.    Wear a sling until your arm is completely \"awake\"    Avoid bumping your arm, leg or foot while it is numb    Avoid extremes of hot or cold while it is numb    Remain quiet and restful the day of surgery.  Resume normal activities gradually over the next day or so as advise by your surgeon.    Do not drive or operate  Any machinery until your extremity is full  \"awake\"        You will have a tingling and prickly sensation in your arm/leg as the feeling begins to return; you can also expect some discomfort. The amount of discomfort is unpredictable, but if you have more pain that can be controlled with the pain medication you received, you " should contact your surgeon.  Start to take your pain pills as soon as you start to feel any discomfort or pain.  We strongly recommend starting your pain medication at bedtime if you haven't already done so.  This is in the event that the block wears off while you are asleep.        Crutch Instructions      Because of your surgery you will need crutches.  Make sure you tell your healthcare provider if crutches do not seem to work for you.  Using Crutches   Crutches are the most commonly used device for injuries to the lower part of the body because they allow the most mobility. All walking assistance devices require strength in your upper body but crutches require more coordination. If you are unable to use crutches then a cane or walker may be better.   Remember these rules when using crutches:   Always look straight ahead when you walk. Do not look down.   Hold the top padded part of the crutches into your chest near your armpits. Grasp the padded hand  and always support your weight with your arms and hands.   Do not lean on the crutches with your armpit as this could cause nerve damage.  Standing Up  Make sure you are in a stable chair. Move forward to the edge of the chair so that your  good  foot is flat on the floor. Put both crutches together and hold the handgrips in one hand. Stretch the injured leg out straight and then use the crutches with one hand and the chair armrest with the other hand to push yourself into a standing position onto your  good  leg. Once you are standing, wait until you are steady before placing a crutch in each hand. Until you become good at standing, have someone assist you in case you lose your balance. When standing still, lean slightly forward with the crutches ahead of you and about 3 feet apart. Unless you are told otherwise, you should keep weight off your injured leg.  Walking  To begin crutch walking, balance yourself on your  good  leg. Move both crutches forward  "about the length of one step and place them firmly on the floor in front of you about 3 feet apart. Support your weight on the padded hand rests while leaning forward. Press the top of the crutches against your chest wall and not into your armpits. Be sure to hold the injured leg up off of the floor. When ready, swing the \"good\" leg forward about one step length past the crutches while supporting your weight on the padded hand . Be careful not to go too far. Transfer your weight onto the \"good\" leg then bring both crutches forward about the length of one step. Repeating this motion will allow you to move fairly quickly without the use of your injured leg. Keep practicing until you become good at crutch walking.  Sitting Down  Make sure the chair you are about to sit on is stable. Walk in front of the chair such that you are facing away from it. Move back a little at a time until the back of your  good  leg is nearly touching the seat. Keeping your weight on the  good  leg, move both crutches to one hand holding onto the handgrips. Lean forward, bend your  good  knee, and move your injured leg out forward. Sit down slowly while using your free hand to reach for the chair s armrest for support. Place the crutches where you can easily get them. Never pivot, even on your  good  leg. This could cause you to fall or injure your  good  leg.  Navigating Stairs, Curbs & Door Steps  Only attempt this once you are very comfortable with regular crutch walking and only when someone is there to steady you should you begin to lose your balance. Hold on to a  railing with one hand and both crutches in the other hand or have someone carry the loose crutch for you. It does not matter which side the handrail is on. If there is no handrail, you can use both crutches. Using only crutches is the same as the railing method but maintaining your balance can be difficult. The crutch-only method is not recommended until you " "have become very good at crutch walking. Be sure to only take one step at a time. A simple rule to remember for crutches when navigating stairs or curbs: up with the  good  leg and down with the  bad .  Going Up Stairs or Curbs  Walk close to the first step with the crutches slightly behind you. Push down on the handrail and the crutch and step up with the \"good\" leg. You may have to make a short hop to do this if you are not allowed to place any weight on the injured leg. Lean forward and bring the injured leg and the crutch up beside the \"good\" leg. Repeat this until you have climbed up all of the steps. Remember, the \"good\" leg goes up first and the crutches move with the injured leg. The person helping you should stand behind and to your side that is away from the railing.  Going Down Stairs or Curbs  Walk to the edge of the first step. While standing and balancing on the  good  leg, place both crutches in one hand and then down on the step below. Be sure to support your weight by leaning on the crutches and handrail. Bring the injured leg forward, then the \"good\" leg down to the same step as the crutches. Remember crutches go down first with the injured leg. The person helping you should  front and to your side that is away from the railing.  Sitting Method for Navigating Stairs  A safer way to get up and down stairs is to sit down. To go up steps, sit down on the second or third step. Push with your  good  leg below while pushing up with both arms on the step above to move your bottom to the next step. When you get to the top of the stairs you may need to use the  scooting  method described later to get back up. To go down steps you first need to lower yourself to the floor near the top of the steps. Once seated, support yourself with your  good  leg on the second to next step below, and push with both arms on the step where you are sitting to move your bottom down to the next step. When you reach the " "bottom, pull yourself up to standing position using your crutches. It is helpful if someone can move your crutches and assist you in getting up and down. With practice it may be possible to manage on your own.  If You Start To Fall  If you start to fall and cannot get your balance, throw the crutches away from you. Try to fall onto your  good  side away from your injury and then break your fall with your arms. If uninjured, you can usually get back up by moving into a sitting position and scooting to a chair. Push with your arms and hands on the chair seat while pushing with the \"good\" leg to get up into the chair. You should not attempt to get back up if you are having significant pain. Call for assistance or dial 911 for an ambulance if necessary.  Safety Tips for Crutch Walking   At first you may want to wear a training belt (or a strong regular belt) so others can assist you.   Do not use crutches if you feel dizzy or drowsy.   Be careful on slick or wet surfaces like a kitchen or bathroom floor, snow, ice, or rainy conditions.   Temporarily remove pets, throw rugs or other items from your home that might trip you.   Do not hop around while holding onto furniture.   Wear sneakers or rubber soled shoes that will not easily slip or come off.   Be careful on ramps or slopes as they can be harder to walk up or down   Do not remove any parts from your crutches especially the padding or rubber traction footings. Replace padding or footings that become damaged immediately.   It is important to use your crutches correctly. If you feel any numbness or tingling in your arms, you are probably using the crutches incorrectly.  Helpful Hints   A bedside toilet or toilet riser may be helpful.   Always allow for extra time to get around. Children in school should be given permission to leave class early to avoid crowds when changing classes.   Elevate the injured leg when sitting.   Use a backpack to carry books or waist pouch " to carry other items so that both hands are free.   Call your healthcare provider if you have any questions or concerns.                              **If you have questions or concerns about your procedure,   call Dr. Gary at [phone]**

## 2017-04-12 NOTE — ANESTHESIA CARE TRANSFER NOTE
Patient: Kaiser Sylvester    Procedure(s):  RIGHT ANKLE FRACTURE REPAIR - Wound Class: I-Clean    Diagnosis: bimalleolar ankle fracture   Diagnosis Additional Information: No value filed.    Anesthesia Type:   General, LMA, Periph. Nerve Block for postop pain     Note:  Airway :Face Mask  Patient transferred to:PACU    awake, talking, report to RN  Vitals: (Last set prior to Anesthesia Care Transfer)    CRNA VITALS  4/12/2017 1326 - 4/12/2017 1404      4/12/2017             Resp Rate (set): 10        BP: 148/92 P:87 SaO2 96        Electronically Signed By: FRANKLIN Piedra CRNA  April 12, 2017  2:04 PM

## 2017-04-12 NOTE — ANESTHESIA PROCEDURE NOTES
Peripheral nerve/Neuraxial procedure note :  Pre-Procedure  Performed by JUAREZ VEGAS  Location:   Procedure Times:4/12/2017 10:40 AM    .             Right  popliteal and saphenous blocks for pain relief at surgeon's request.  With the patient in the prone position, after IV started,  pulse oximeter in place and Versed 2mg IV: 22 GA Stimuplex and 25 GA; 50cc (40 + 10) 1/2 % Ropivicaine; 1;200,000 Epi

## 2017-04-13 ENCOUNTER — TELEPHONE (OUTPATIENT)
Dept: PODIATRY | Facility: CLINIC | Age: 62
End: 2017-04-13

## 2017-04-13 NOTE — OP NOTE
DATE OF SURGERY:  04/12/2017.      SURGEON:  Jorge Luis Gary DPM      ASSISTANT:  Chip Carvajal, PGY2      PREOPERATIVE DIAGNOSIS:  Unstable bimalleolar ankle fracture, right lower extremity.      POSTOPERATIVE DIAGNOSIS:  Unstable bimalleolar ankle fracture, right lower extremity.      PROCEDURE:  Open reduction internal fixation, right bimalleolar ankle fracture.      ANESTHESIA:  General endotracheal with popliteal and saphenous nerve blocks.      HEMOSTASIS:  Well-padded pneumatic thigh cuff.      ESTIMATED BLOOD LOSS:  Less than 5 mL.      MATERIALS:  See nursing note for details.      INJECTABLES:  Preoperative popliteal and saphenous nerve blocks.      COMPLICATIONS:  None apparent.      INDICATIONS FOR PROCEDURE:  Kaiser Sylvester is a pleasant 62-year-old male who is referred to me for repair of a bimalleolar ankle fracture.  He sustained a work comp injury when a garage door fell on his ankle.  We discussed that because of the nature of the injury, this is an unstable fracture and will need to be fixed.      DESCRIPTION OF PROCEDURE:  After obtaining written consent, the patient was transferred to the operating room.  He received preoperative popliteal and saphenous nerve blocks.  The right lower extremity was then prepped and draped in normal aseptic fashion, exsanguinated and the well-padded pneumatic thigh cuff was inflated.      Attention was directed to the lateral aspect of the ankle where a 12 cm linear incision was carried down to subcutaneous tissue.  Bleeding vessels were electrocauterized as necessary.  No pertinent neurovascular structures were identified and so the incision was then carried down to bone and subperiosteal dissection was performed.  The fracture was identified and mobilized.  There was a small cortical fragment that was in place.  Because we felt the fibula was mildly shortened, we applied the plate and permanently fixated this distally.  We then put a proximal push-pull screw  proximal to the plate and were able to get the fibula out to length.  This was then temporarily fixated with a trans tib-fib 0.062 K-wire.  We then placed the proximal locking and nonlocking screws in standard fashion.  I did have a transmalleolar reduction clamp in place while applying the plate for appropriate reduction of the talus and the fibular fragment.  The small cortical fragment was noted to fit perfectly within its place, indicating adequate position and length.  Attention was then directed to the medial malleolus where an 8 cm linear incision was carried down over the medial malleolus and distal tibia.  The fracture was identified and the hematoma was evacuated, as was done with the fibular fracture.  I tried to leave as much soft tissue intact to the capital fragment.  This was reduced and temporarily fixated with crossing 0.045 K-wires.  I then applied the tibial hook plate and tamped this into place and temporarily fixated it with a BB tack.  Imaging showed good reduction of the talus, good reduction of the medial gutter and good reduction of the fracture.  We then applied the oblong hole compression screw.  The plate was then further fixated with 1 locking screw.  I then applied a screw through the hook plate prongs for additional capital fragment fixation.      The wounds were flushed with copious amounts of normal saline.  We stressed the ankle under live intensification and found no distal tib-fib diastasis, as well as no medial or lateral gutter widening.  No syndesmotic repair was deemed necessary.      Layered closure was performed, including periosteum, subq and skin closure.  A dry sterile dressing was applied.  He was transferred to the PACU with vital signs stable and vascular status intact to all digits of the foot.      PLAN:  The patient will be nonweightbearing and will follow up with me in clinic in approximately 1 week or sooner with any acute issues.         CHUY TREADWELL,  MIKEY             D: 2017 16:10   T: 2017 17:52   MT: TS      Name:     JAJA DOUGLAS   MRN:      [id number]        Account:        [id number]   :      1955           Procedure Date: 2017      Document: [id number]       cc: Jorge Luis Gary DPM

## 2017-04-18 ENCOUNTER — OFFICE VISIT (OUTPATIENT)
Dept: PODIATRY | Facility: CLINIC | Age: 62
End: 2017-04-18
Payer: OTHER MISCELLANEOUS

## 2017-04-18 VITALS — HEIGHT: 76 IN | HEART RATE: 86 BPM | BODY MASS INDEX: 37.99 KG/M2 | WEIGHT: 312 LBS

## 2017-04-18 DIAGNOSIS — Z87.81 STATUS POST ORIF OF FRACTURE OF ANKLE: Primary | ICD-10-CM

## 2017-04-18 DIAGNOSIS — Z98.890 STATUS POST ORIF OF FRACTURE OF ANKLE: Primary | ICD-10-CM

## 2017-04-18 PROCEDURE — 99024 POSTOP FOLLOW-UP VISIT: CPT | Performed by: PODIATRIST

## 2017-04-18 NOTE — MR AVS SNAPSHOT
"              After Visit Summary   4/18/2017    Kaiser Sylvester    MRN: [id number]           Patient Information     Date Of Birth          1955        Visit Information        Provider Department      4/18/2017 9:15 AM Jorge Luis Gary DPM [facility]         Follow-ups after your visit        Your next 10 appointments already scheduled     Apr 25, 2017  9:15 AM CDT   Return Visit with Jorge Luis Gary DPM   [facility] ([facility] [place])    [address] [address] [address]   [phone]              Who to contact     If you have questions or need follow up information about today's clinic visit or your schedule please contact [facility] directly at [phone].  Normal or non-critical lab and imaging results will be communicated to you by MyChart, letter or phone within 4 business days after the clinic has received the results. If you do not hear from us within 7 days, please contact the clinic through MyChart or phone. If you have a critical or abnormal lab result, we will notify you by phone as soon as possible.  Submit refill requests through [employer] or call your pharmacy and they will forward the refill request to us. Please allow 3 business days for your refill to be completed.          Additional Information About Your Visit        [employer] Information     [employer] lets you send messages to your doctor, view your test results, renew your prescriptions, schedule appointments and more. To sign up, go to [email or web address] . Click on \"Log in\" on the left side of the screen, which will take you to the Welcome page. Then click on \"Sign up Now\" on the right side of the page.     You will be asked to enter the access code listed below, as well as some personal information. Please follow the directions to create your username and password.     Your access code is: [email or web address]  Expires: 6/16/2017 12:00 PM     Your access code " "will  in 90 days. If you need help or a new code, please call your [place] clinic or [phone].        Care EveryWhere ID     This is your Care EveryWhere ID. This could be used by other organizations to access your [place] medical records  [id number]        Your Vitals Were     Pulse Height BMI (Body Mass Index)             86 6' 3.5\" (1.918 m) 38.48 kg/m2          Blood Pressure from Last 3 Encounters:   17 136/86   17 144/87   12 187/106    Weight from Last 3 Encounters:   17 (!) 312 lb (141.5 kg)   17 (!) 312 lb (141.5 kg)   17 295 lb (133.8 kg)              Today, you had the following     No orders found for display       Primary Care Provider    Physician No Ref-Primary       No address on file        Thank you!     Thank you for choosing [facility]  for your care. Our goal is always to provide you with excellent care. Hearing back from our patients is one way we can continue to improve our services. Please take a few minutes to complete the written survey that you may receive in the mail after your visit with us. Thank you!             Your Updated Medication List - Protect others around you: Learn how to safely use, store and throw away your medicines at www.disposemymeds.org.          This list is accurate as of: 17  9:31 AM.  Always use your most recent med list.                   Brand Name Dispense Instructions for use    GLUCOSAMINE CHONDR COMPLEX PO      Take 1 tablet by mouth daily       hydrOXYzine 25 MG tablet    ATARAX    50 tablet    Take 1-2 tablets every 4-6 hours as needed for pain control.       LISINOPRIL PO      Take 20 mg by mouth daily       order for DME     1 Device    Equipment being ordered: [employer]About x 3 months       oxyCODONE-acetaminophen 5-325 MG per tablet    PERCOCET    50 tablet    Take 1-2 tablets every 4-6 hours as needed for pain.  Do not take other tylenol products with this medication, as too much tylenol " can be damaging to the liver.       pravastatin 40 MG tablet    PRAVACHOL     Take 40 mg by mouth daily       senna-docusate 8.6-50 MG per tablet    SENOKOT-S;PERICOLACE    30 tablet    Take 2 tablets daily as needed for constipation while taking prescription pain medications.

## 2017-04-18 NOTE — PROGRESS NOTES
"Foot & Ankle Surgery  April 18, 2017    S:  Patient in today approx 1 week sp ORIF bimalleolar fracture.  Pain levels low.  Took 1 pain pill today, 1 last night.  Following post-op instructions    Pulse 86  Ht 6' 3.5\" (1.918 m)  Wt (!) 312 lb (141.5 kg)  BMI 38.48 kg/m2      ROS - positive for CC.  Patient denies current nausea, vomiting, chills, fevers, belly pain, calf pain, chest pain or SOB.  Complete remainder of ROS is otherwise neg.    PE - lateral incision healing well, unremarkable.  Medial incision shows some superficial blistering, but no dehiscence or SOI.  Edema wnl for this stage post-op.  Skin shows no trophic, color or temperature changes otherwise.  No calf redness, swelling or pain noted otherwise.    Imaging - post-op xrays - IMPRESSION: Plate-screw fixation of the distal fibular and medial  malleolar fractures. Calcaneal spurring.       A/P - 62 year old yo patient approx 1 week sp above procedure  -personally reviewed imaging with patient  -reviewed procedure and surgical findings  -redressed today  -continue all post-op orders without change; reviewed  -discussed blistering at medial incision, and how this may delay suture removal  -work letter today, indicating 10+ weeks for return to work.    Follow up  -  1 week     Plan for nxvnbj-bh-ifhz - 10+ weeks    Body mass index is 38.48 kg/(m^2).  Weight management plan: Patient was referred to their PCP to discuss a diet and exercise plan.      Jorge Luis Gary DPM   Podiatric Foot & Ankle Surgeon  [facility]  [phone]    "

## 2017-04-18 NOTE — Clinical Note
Art Saab was finally cleared for surgery, and we fixed the bimalleolar injury last week. He's doing well and will follow up in 1 week for suture removal.  thanks  Jorge Luis

## 2017-04-18 NOTE — LETTER
April 18, 2017      Kaiser Sylvester  [address] [facility] [address]        To whom it may concern:    I saw Kaiser Sylvester today, 1 week s/p ORIF right lower extremity bimalleolar ankle fracture.  He'll follow up next week for wound evaluation and suture removal.  We anticipate this will be at least 10 weeks for proper healing prior to being cleared to return to work.    Thank you        Jorge Luis Gary DPM   Podiatric Foot & Ankle Surgeon  [facility]

## 2017-04-18 NOTE — NURSING NOTE
"Chief Complaint   Patient presents with     Surgical Followup     1 wk post op       Initial Pulse 86  Ht 6' 3.5\" (1.918 m)  Wt (!) 312 lb (141.5 kg)  BMI 38.48 kg/m2 Estimated body mass index is 38.48 kg/(m^2) as calculated from the following:    Height as of this encounter: 6' 3.5\" (1.918 m).    Weight as of this encounter: 312 lb (141.5 kg).  Medication Reconciliation: complete  "

## 2017-04-25 ENCOUNTER — OFFICE VISIT (OUTPATIENT)
Dept: PODIATRY | Facility: CLINIC | Age: 62
End: 2017-04-25
Payer: OTHER MISCELLANEOUS

## 2017-04-25 VITALS — WEIGHT: 312 LBS | HEIGHT: 76 IN | HEART RATE: 82 BPM | BODY MASS INDEX: 37.99 KG/M2

## 2017-04-25 DIAGNOSIS — Z98.890 STATUS POST ORIF OF FRACTURE OF ANKLE: ICD-10-CM

## 2017-04-25 DIAGNOSIS — L08.9 SKIN INFECTION: Primary | ICD-10-CM

## 2017-04-25 DIAGNOSIS — Z87.81 STATUS POST ORIF OF FRACTURE OF ANKLE: ICD-10-CM

## 2017-04-25 PROCEDURE — 99024 POSTOP FOLLOW-UP VISIT: CPT | Performed by: PODIATRIST

## 2017-04-25 RX ORDER — CEPHALEXIN 500 MG/1
500 CAPSULE ORAL 2 TIMES DAILY
Qty: 20 CAPSULE | Refills: 0 | Status: SHIPPED | OUTPATIENT
Start: 2017-04-25 | End: 2017-05-22

## 2017-04-25 NOTE — NURSING NOTE
"Chief Complaint   Patient presents with     Surgical Followup     DOS 4/12/17 R ankle, medial ankle wound still had drainage       Initial Pulse 82  Ht 6' 3.5\" (1.918 m)  Wt (!) 312 lb (141.5 kg)  BMI 38.48 kg/m2 Estimated body mass index is 38.48 kg/(m^2) as calculated from the following:    Height as of this encounter: 6' 3.5\" (1.918 m).    Weight as of this encounter: 312 lb (141.5 kg).  Medication Reconciliation: complete  "

## 2017-04-25 NOTE — PROGRESS NOTES
"Foot & Ankle Surgery  April 25, 2017    S:  Patient in today approx 2 weeks sp ORIF bimalleolar ankle fracture.  Pain levels minimal.  Has not taken pain meds in 1+ week.  Medial incision slight drainage and erythema, had blisters last visit    Pulse 82  Ht 6' 3.5\" (1.918 m)  Wt (!) 312 lb (141.5 kg)  BMI 38.48 kg/m2      ROS - positive for CC.  Patient denies current nausea, vomiting, chills, fevers, belly pain, calf pain, chest pain or SOB.  Complete remainder of ROS is otherwise neg.    PE - lateral incision healing quite well, minimal edema, no SOI.  Medial incision shows some superficial sloughing and serous drainage.  No full-thickness dehiscence.  Skin shows no trophic, color or temperature changes otherwise.  No calf redness, swelling or pain noted otherwise.    A/P - 62 year old yo patient approx 2 weeks sp above procedure  -Rx for keflex for low-grade medial incision infection  -redressed; keep c/d/i  -continue all post-op orders to facilitate medial incision healing    Follow up  -  1 week     Plan for kcisup-je-iacy - 10+ weeks; work note again dispensed with anticipated time off.    Body mass index is 38.48 kg/(m^2).  Weight management plan: Patient was referred to their PCP to discuss a diet and exercise plan.      Jorge Luis Gary, MIKEY   Podiatric Foot & Ankle Surgeon  [facility]  [phone]    "

## 2017-04-25 NOTE — LETTER
April 25, 2017      Kaiser Sylvester  [address] [place] [id number]      To whom it may concern:    aKiser Sylvester underwent ORIF for bimalleolar fracture.  The incision medially is slow to heal, and we'll see him back in 1 week for wound check and possible suture removal.  We anticipate 10+ weeks until he's healed sufficiently to return to work.    Thanks      Jorge Luis Gary DPM   Podiatric Foot & Ankle Surgeon  [facility]

## 2017-04-25 NOTE — PATIENT INSTRUCTIONS
DR. TREADWELL'S CLINIC LOCATIONS:       MONDAY - [place]  TUESDAY - [place]   [address] [facility] [address] [facility] [address]   [place] [address] [place], [place] [id number] [phone]       WEDNESDAY - SURGERY THURSDAY AM - [place]   [phone] [address]    [place], [place] [address] [phone]       THURSDAY PM - [place] FRIDAY AM - [place]   [address] [facility] [address] [place] [address] [place] [address]   [phone]       APPT SCHEDULING: SEND FAXES TO:   [phone]     Follow Up: 1 week    Finish ABXs    BODY MASS INDEX (BMI)  Many things can cause foot and ankle problems. Foot structure, activity level, foot mechanics and injuries are common causes of pain.    One very important issue that often goes unmentioned, is body weight.  Extra weight can cause increased stress on muscles, ligaments, bones and tendons. Sometimes just a few extra pounds is all it takes to put one over her/his threshold. Without reducing that stress, it can be difficult to alleviate pain.      Some people are uncomfortable addressing this issue, but we feel it is important for you to think about it. As Foot & Ankle specialists, our job is addressing the lower extremity problem and possible causes.     Regarding extra body weight, we encourage patients to discuss diet and weight management plans with their primary care doctors. It is this team approach that gives you the best opportunity for pain relief and getting you back on your feet.

## 2017-04-25 NOTE — MR AVS SNAPSHOT
After Visit Summary   4/25/2017    Kaiser Sylvester    MRN: [id number]           Patient Information     Date Of Birth          1955        Visit Information        Provider Department      4/25/2017 9:15 AM Jorge Luis Gary DPM [facility] [place] [facility]        Today's Diagnoses     Skin infection    -  1    Status post ORIF of fracture of ankle          Care Instructions      DR. GARY'S CLINIC LOCATIONS:       MONDAY - [place] TUESDAY - [place]   [address] [facility] [address] [facility] [address]   [place] [address] [place] [address]   [phone]       WEDNESDAY - SURGERY THURSDAY AM - [place]   [phone] [address]    [place], [place] [address] [phone]       THURSDAY PM - [place] FRIDAY AM - [place]   [address] [place] [address] [place] [address]   [phone]       APPT SCHEDULING: SEND FAXES TO:   [phone]     Follow Up: 1 week    Finish ABXs    BODY MASS INDEX (BMI)  Many things can cause foot and ankle problems. Foot structure, activity level, foot mechanics and injuries are common causes of pain.    One very important issue that often goes unmentioned, is body weight.  Extra weight can cause increased stress on muscles, ligaments, bones and tendons. Sometimes just a few extra pounds is all it takes to put one over her/his threshold. Without reducing that stress, it can be difficult to alleviate pain.      Some people are uncomfortable addressing this issue, but we feel it is important for you to think about it. As Foot & Ankle specialists, our job is addressing the lower extremity problem and possible causes.     Regarding extra body weight, we encourage patients to discuss diet and weight management plans with their primary care doctors. It is this team approach that gives you the best opportunity for pain relief and getting you back on your feet.                Follow-ups after  "your visit        Your next 10 appointments already scheduled     May 01, 2017  1:45 PM CDT   Return Visit with Jorge Luis Gary DPM   [facility]    [address] [facility]  [address]  [place] [address] [phone]              Who to contact     If you have questions or need follow up information about today's clinic visit or your schedule please contact [facility] directly at [phone].  Normal or non-critical lab and imaging results will be communicated to you by MyChart, letter or phone within 4 business days after the clinic has received the results. If you do not hear from us within 7 days, please contact the clinic through Every[employer]Mobile[employer] or phone. If you have a critical or abnormal lab result, we will notify you by phone as soon as possible.  Submit refill requests through [employer] or call your pharmacy and they will forward the refill request to us. Please allow 3 business days for your refill to be completed.          Additional Information About Your Visit        Every1Mobilehart Information     [employer] lets you send messages to your doctor, view your test results, renew your prescriptions, schedule appointments and more. To sign up, go to [email or web address] . Click on \"Log in\" on the left side of the screen, which will take you to the Welcome page. Then click on \"Sign up Now\" on the right side of the page.     You will be asked to enter the access code listed below, as well as some personal information. Please follow the directions to create your username and password.     Your access code is: [email or web address]  Expires: 2017 12:00 PM     Your access code will  in 90 days. If you need help or a new code, please call your [facility] clinic or [phone].        Care EveryWhere ID     This is your Care EveryWhere ID. This could be used by other organizations to access your [facility] medical records  [id number]        Your Vitals Were     Pulse " "Height BMI (Body Mass Index)             82 6' 3.5\" (1.918 m) 38.48 kg/m2          Blood Pressure from Last 3 Encounters:   04/12/17 136/86   03/18/17 144/87   01/27/12 187/106    Weight from Last 3 Encounters:   04/25/17 (!) 312 lb (141.5 kg)   04/18/17 (!) 312 lb (141.5 kg)   04/12/17 (!) 312 lb (141.5 kg)              Today, you had the following     No orders found for display         Today's Medication Changes          These changes are accurate as of: 4/25/17  9:15 AM.  If you have any questions, ask your nurse or doctor.               Start taking these medicines.        Dose/Directions    cephALEXin 500 MG capsule   Commonly known as:  KEFLEX   Used for:  Skin infection   Started by:  Jorge Luis Gary DPM        Dose:  500 mg   Take 1 capsule (500 mg) by mouth 2 times daily   Quantity:  20 capsule   Refills:  0            Where to get your medicines      These medications were sent to [facility] [address] [place] [address]     Phone:  [phone]     cephALEXin 500 MG capsule                Primary Care Provider    Physician No Ref-Primary       No address on file        Thank you!     Thank you for choosing [facility]  for your care. Our goal is always to provide you with excellent care. Hearing back from our patients is one way we can continue to improve our services. Please take a few minutes to complete the written survey that you may receive in the mail after your visit with us. Thank you!             Your Updated Medication List - Protect others around you: Learn how to safely use, store and throw away your medicines at www.disposemymeds.org.          This list is accurate as of: 4/25/17  9:15 AM.  Always use your most recent med list.                   Brand Name Dispense Instructions for use    cephALEXin 500 MG capsule    KEFLEX    20 capsule    Take 1 capsule (500 mg) by mouth 2 " times daily       GLUCOSAMINE CHONDR COMPLEX PO      Take 1 tablet by mouth daily       hydrOXYzine 25 MG tablet    ATARAX    50 tablet    Take 1-2 tablets every 4-6 hours as needed for pain control.       LISINOPRIL PO      Take 20 mg by mouth daily       order for DME     1 Device    Equipment being ordered: RollAbout x 3 months       oxyCODONE-acetaminophen 5-325 MG per tablet    PERCOCET    50 tablet    Take 1-2 tablets every 4-6 hours as needed for pain.  Do not take other tylenol products with this medication, as too much tylenol can be damaging to the liver.       pravastatin 40 MG tablet    PRAVACHOL     Take 40 mg by mouth daily       senna-docusate 8.6-50 MG per tablet    SENOKOT-S;PERICOLACE    30 tablet    Take 2 tablets daily as needed for constipation while taking prescription pain medications.

## 2017-05-01 ENCOUNTER — OFFICE VISIT (OUTPATIENT)
Dept: PODIATRY | Facility: CLINIC | Age: 62
End: 2017-05-01
Payer: COMMERCIAL

## 2017-05-01 VITALS — HEART RATE: 88 BPM | WEIGHT: 312 LBS | BODY MASS INDEX: 37.99 KG/M2 | RESPIRATION RATE: 16 BRPM | HEIGHT: 76 IN

## 2017-05-01 DIAGNOSIS — Z98.890 STATUS POST ORIF OF FRACTURE OF ANKLE: Primary | ICD-10-CM

## 2017-05-01 DIAGNOSIS — Z87.81 STATUS POST ORIF OF FRACTURE OF ANKLE: Primary | ICD-10-CM

## 2017-05-01 PROCEDURE — 99024 POSTOP FOLLOW-UP VISIT: CPT | Performed by: PODIATRIST

## 2017-05-01 NOTE — PROGRESS NOTES
"Foot & Ankle Surgery  May 1, 2017    S:  Patient in today approx 3 weeks sp ORIF R ankle fracture.  Pain levels minimal.  No pain meds.  Rx for keflex last visit, medial incision looking much better    Pulse 88  Resp 16  Ht 6' 3.5\" (1.918 m)  Wt (!) 312 lb (141.5 kg)  BMI 38.48 kg/m2      ROS - positive for CC.  Patient denies current nausea, vomiting, chills, fevers, belly pain, calf pain, chest pain or SOB.  Complete remainder of ROS is otherwise neg.    PE - lateral incision healed, sutures removed without issues.  Medial incision looks much better.  epithelializing.  Skin shows no trophic, color or temperature changes otherwise.  No calf redness, swelling or pain noted otherwise.    A/P - 62 year old yo patient approx 3 weeks sp above procedure  -lateral sutures removed  -redressed; medial incision sutures left intact  -continue all post-op instructions  -note to be off work 10+ weeks(this needs to be done weekly)    Follow up  -  1 week with Dr Peña for medial suture removal    Plan for hdgdba-dv-aemd - 10+ weeks    Body mass index is 38.48 kg/(m^2).  Weight management plan: Patient was referred to their PCP to discuss a diet and exercise plan.      Jorge Luis Gary DPM   Podiatric Foot & Ankle Surgeon  [facility]  [phone]    "

## 2017-05-01 NOTE — MR AVS SNAPSHOT
"              After Visit Summary   5/1/2017    Kaiser Sylvester    MRN: [id number]           Patient Information     Date Of Birth          1955        Visit Information        Provider Department      5/1/2017 1:45 PM Jorge Luis Gary DPM [facility] [place]         Follow-ups after your visit        Your next 10 appointments already scheduled     May 10, 2017  8:45 AM CDT   Return Visit with Michael Peña DPM   [facility] [place] ([facility])    [address] [facility]  [address]  [facility] [address] [phone]              Who to contact     If you have questions or need follow up information about today's clinic visit or your schedule please contact [facility] directly at [phone].  Normal or non-critical lab and imaging results will be communicated to you by MyChart, letter or phone within 4 business days after the clinic has received the results. If you do not hear from us within 7 days, please contact the clinic through MyChart or phone. If you have a critical or abnormal lab result, we will notify you by phone as soon as possible.  Submit refill requests through [employer] or call your pharmacy and they will forward the refill request to us. Please allow 3 business days for your refill to be completed.          Additional Information About Your Visit        [facility] [employer] Information     [employer] lets you send messages to your doctor, view your test results, renew your prescriptions, schedule appointments and more. To sign up, go to [email or web address] . Click on \"Log in\" on the left side of the screen, which will take you to the Welcome page. Then click on \"Sign up Now\" on the right side of the page.     You will be asked to enter the access code listed below, as well as some personal information. Please follow the directions to create your username and password.     Your access code is: [email or web address]  Expires: 6/16/2017 12:00 PM     Your access code will " " in 90 days. If you need help or a new code, please call your [facility] clinic or [phone].        Care EveryWhere ID     This is your Care EveryWhere ID. This could be used by other organizations to access your [facility] medical records  [id number]        Your Vitals Were     Pulse Respirations Height BMI (Body Mass Index)          88 16 6' 3.5\" (1.918 m) 38.48 kg/m2         Blood Pressure from Last 3 Encounters:   17 136/86   17 144/87   12 187/106    Weight from Last 3 Encounters:   17 (!) 312 lb (141.5 kg)   17 (!) 312 lb (141.5 kg)   17 (!) 312 lb (141.5 kg)              Today, you had the following     No orders found for display         Today's Medication Changes          These changes are accurate as of: 17  2:24 PM.  If you have any questions, ask your nurse or doctor.               Stop taking these medicines if you haven't already. Please contact your care team if you have questions.     hydrOXYzine 25 MG tablet   Commonly known as:  ATARAX   Stopped by:  Jorge Luis Gary DPM           oxyCODONE-acetaminophen 5-325 MG per tablet   Commonly known as:  PERCOCET   Stopped by:  Jorge Luis Gary DPM           senna-docusate 8.6-50 MG per tablet   Commonly known as:  SENOKOT-S;PERICOLACE   Stopped by:  Jorge Luis Gary DPM                    Primary Care Provider    Physician No Ref-Primary       No address on file        Thank you!     Thank you for choosing [facility]  for your care. Our goal is always to provide you with excellent care. Hearing back from our patients is one way we can continue to improve our services. Please take a few minutes to complete the written survey that you may receive in the mail after your visit with us. Thank you!             Your Updated Medication List - Protect others around you: Learn how to safely use, store and throw away your medicines at www.disposemymeds.org.          This list is accurate as of: " 5/1/17  2:24 PM.  Always use your most recent med list.                   Brand Name Dispense Instructions for use    cephALEXin 500 MG capsule    KEFLEX    20 capsule    Take 1 capsule (500 mg) by mouth 2 times daily       GLUCOSAMINE CHONDR COMPLEX PO      Take 1 tablet by mouth daily       LISINOPRIL PO      Take 20 mg by mouth daily       order for DME     1 Device    Equipment being ordered: RollAbout x 3 months       pravastatin 40 MG tablet    PRAVACHOL     Take 40 mg by mouth daily

## 2017-05-01 NOTE — Clinical Note
Art Layton  I'm out of the office next week, so Mr Sylvester is scheduled with you next week.  He'll be 4 weeks out from ORIF of bimalleolar fracture.  The medial incision has been slow to heal, but it's looking much better today and should be ready for suture removal.  Thanks  Jorge Luis

## 2017-05-01 NOTE — NURSING NOTE
"Chief Complaint   Patient presents with     Surgical Followup     DOS 4/12/17 R ankle fx repair       Initial Pulse 88  Resp 16  Ht 6' 3.5\" (1.918 m)  Wt (!) 312 lb (141.5 kg)  BMI 38.48 kg/m2 Estimated body mass index is 38.48 kg/(m^2) as calculated from the following:    Height as of this encounter: 6' 3.5\" (1.918 m).    Weight as of this encounter: 312 lb (141.5 kg).  Medication Reconciliation: complete  "

## 2017-05-01 NOTE — LETTER
May 1, 2017      Kaiser Sylvester  [address] [place] [phone]    To whom it may concern:    Kaiser was seen today for incision follow up.  He's doing much better, but again will require 10+ week for sufficient bone healing to allow return to work.  We'll see him back next week for reassessment.    Thanks        Jorge Luis Gary, ALBINOM   Podiatric Foot & Ankle Surgeon  [facility]

## 2017-05-10 ENCOUNTER — OFFICE VISIT (OUTPATIENT)
Dept: PODIATRY | Facility: CLINIC | Age: 62
End: 2017-05-10
Payer: COMMERCIAL

## 2017-05-10 VITALS — WEIGHT: 312 LBS | HEART RATE: 86 BPM | HEIGHT: 76 IN | BODY MASS INDEX: 37.99 KG/M2 | RESPIRATION RATE: 18 BRPM

## 2017-05-10 DIAGNOSIS — Z09 SURGERY FOLLOW-UP EXAMINATION: Primary | ICD-10-CM

## 2017-05-10 DIAGNOSIS — S82.841A BIMALLEOLAR ANKLE FRACTURE, RIGHT, CLOSED, INITIAL ENCOUNTER: ICD-10-CM

## 2017-05-10 PROCEDURE — 99024 POSTOP FOLLOW-UP VISIT: CPT | Performed by: PODIATRIST

## 2017-05-10 NOTE — NURSING NOTE
"Chief Complaint   Patient presents with     Surgical Followup     R ankle, medial suture removal       Initial Pulse 86  Resp 18  Ht 6' 3.5\" (1.918 m)  Wt (!) 312 lb (141.5 kg)  BMI 38.48 kg/m2 Estimated body mass index is 38.48 kg/(m^2) as calculated from the following:    Height as of this encounter: 6' 3.5\" (1.918 m).    Weight as of this encounter: 312 lb (141.5 kg).  Medication Reconciliation: complete  "

## 2017-05-10 NOTE — LETTER
5/10/2017       RE: Kaiser Sylvester  [address]  [facility] [address]           Dear Colleague,    [facility] Please see a copy of my visit note below.    S: Pt presents 4 weeks  post op.      Procedure(s) performed: ORIF right ankle    Pain is minimal.  He is using a wheeled knee walker  No complaints        O: Vascular:  Pedal pulses palpable.  Mild right ankle edema.    Neuro: light touch sensation intact distal to incision.    Derm: Incisions are well coapted.  Sutures intact.  No ted-incisional erythema.        ASSESSMENT:  1) s/p above surgery.  No clinical signs of infection.  Pain is controlled.  Encounter Diagnoses   Name Primary?     Surgery follow-up examination Yes     Bimalleolar ankle fracture, right, closed, initial encounter        PLAN:  1) Incision prepped with betadine.  Remaining sutures removed from the medial ankle.  Steri-strips applied  2) Workability/ update letter provided for his employer  3) continue non weight bearing, elevation, compression    Follow up with Dr. Gary next week.    Michael Peña DPM;      Again, thank you for allowing me to participate in the care of your patient.        Sincerely,              Michael Peña DPM

## 2017-05-10 NOTE — LETTER
[facility]  [address] [place] [address]  Ph: [phone]  Fx: [phone]      May 10, 2017      Kaiser Sylvester  [address] [facility] [address]        To whom it may concern:    Kaiser was seen today for a post operative appointment. He's doing much better, but again will require 10+ week for sufficient bone healing to allow return to work. We'll see him back next week for reassessment with Dr. Jorge Luis Gary.        Thanks,        Michael Peña DPM

## 2017-05-10 NOTE — PROGRESS NOTES
S: Pt presents 4 weeks  post op.      Procedure(s) performed: ORIF right ankle    Pain is minimal.  He is using a wheeled knee walker  No complaints        O: Vascular:  Pedal pulses palpable.  Mild right ankle edema.    Neuro: light touch sensation intact distal to incision.    Derm: Incisions are well coapted.  Sutures intact.  No ted-incisional erythema.        ASSESSMENT:  1) s/p above surgery.  No clinical signs of infection.  Pain is controlled.  Encounter Diagnoses   Name Primary?     Surgery follow-up examination Yes     Bimalleolar ankle fracture, right, closed, initial encounter        PLAN:  1) Incision prepped with betadine.  Remaining sutures removed from the medial ankle.  Steri-strips applied  2) Workability/ update letter provided for his employer  3) continue non weight bearing, elevation, compression    Follow up with Dr. Gary next week.    Michael Peña DPM;

## 2017-05-10 NOTE — MR AVS SNAPSHOT
"              After Visit Summary   5/10/2017    Kaiser Sylvester    MRN: [id number]           Patient Information     Date Of Birth          1955        Visit Information        Provider Department      5/10/2017 8:45 AM Michael Peña DPM [facility] [place]         Follow-ups after your visit        Your next 10 appointments already scheduled     May 15, 2017 11:45 AM CDT   Return Visit with Jorge Luis Gary DPM   [facility] [place] ([facility]    [address]  [address]  [facility] [address] [phone]              Who to contact     If you have questions or need follow up information about today's clinic visit or your schedule please contact [facility] directly at [phone].  Normal or non-critical lab and imaging results will be communicated to you by MyChart, letter or phone within 4 business days after the clinic has received the results. If you do not hear from us within 7 days, please contact the clinic through MyChart or phone. If you have a critical or abnormal lab result, we will notify you by phone as soon as possible.  Submit refill requests through DriftToIt or call your pharmacy and they will forward the refill request to us. Please allow 3 business days for your refill to be completed.          Additional Information About Your Visit        Uruuthart Information     DriftToIt lets you send messages to your doctor, view your test results, renew your prescriptions, schedule appointments and more. To sign up, go to [email or web address] . Click on \"Log in\" on the left side of the screen, which will take you to the Welcome page. Then click on \"Sign up Now\" on the right side of the page.     You will be asked to enter the access code listed below, as well as some personal information. Please follow the directions to create your username and password.     Your access code is: [email or web address]  Expires: 6/16/2017 12:00 PM     Your access code will " " in 90 days. If you need help or a new code, please call your [facility] clinic or [phone].        Care EveryWhere ID     This is your Care EveryWhere ID. This could be used by other organizations to access your [facility] medical records  [id number]        Your Vitals Were     Pulse Respirations Height BMI (Body Mass Index)          86 18 6' 3.5\" (1.918 m) 38.48 kg/m2         Blood Pressure from Last 3 Encounters:   17 136/86   17 144/87   12 187/106    Weight from Last 3 Encounters:   05/10/17 (!) 312 lb (141.5 kg)   17 (!) 312 lb (141.5 kg)   17 (!) 312 lb (141.5 kg)              Today, you had the following     No orders found for display       Primary Care Provider    Physician No Ref-Primary       No address on file        Thank you!     Thank you for choosing [facility] [place]  for your care. Our goal is always to provide you with excellent care. Hearing back from our patients is one way we can continue to improve our services. Please take a few minutes to complete the written survey that you may receive in the mail after your visit with us. Thank you!             Your Updated Medication List - Protect others around you: Learn how to safely use, store and throw away your medicines at www.disposemymeds.org.          This list is accurate as of: 5/10/17  9:33 AM.  Always use your most recent med list.                   Brand Name Dispense Instructions for use    cephALEXin 500 MG capsule    KEFLEX    20 capsule    Take 1 capsule (500 mg) by mouth 2 times daily       GLUCOSAMINE CHONDR COMPLEX PO      Take 1 tablet by mouth daily       LISINOPRIL PO      Take 20 mg by mouth daily       order for DME     1 Device    Equipment being ordered: RollAbout x 3 months       pravastatin 40 MG tablet    PRAVACHOL     Take 40 mg by mouth daily         "

## 2017-05-22 ENCOUNTER — RADIANT APPOINTMENT (OUTPATIENT)
Dept: GENERAL RADIOLOGY | Facility: CLINIC | Age: 62
End: 2017-05-22
Attending: PODIATRIST
Payer: OTHER MISCELLANEOUS

## 2017-05-22 ENCOUNTER — OFFICE VISIT (OUTPATIENT)
Dept: PODIATRY | Facility: CLINIC | Age: 62
End: 2017-05-22
Payer: OTHER MISCELLANEOUS

## 2017-05-22 VITALS — BODY MASS INDEX: 37.99 KG/M2 | WEIGHT: 312 LBS | HEIGHT: 76 IN | HEART RATE: 78 BPM | RESPIRATION RATE: 16 BRPM

## 2017-05-22 DIAGNOSIS — Z98.890 POST-OPERATIVE STATE: ICD-10-CM

## 2017-05-22 DIAGNOSIS — Z87.81 STATUS POST ORIF OF FRACTURE OF ANKLE: Primary | ICD-10-CM

## 2017-05-22 DIAGNOSIS — Z98.890 STATUS POST ORIF OF FRACTURE OF ANKLE: Primary | ICD-10-CM

## 2017-05-22 PROCEDURE — 73610 X-RAY EXAM OF ANKLE: CPT | Mod: RT

## 2017-05-22 PROCEDURE — 99024 POSTOP FOLLOW-UP VISIT: CPT | Performed by: PODIATRIST

## 2017-05-22 NOTE — LETTER
May 22, 2017      Kaiser Sylvester  [address] [facility] [address]      To whom it may concern:    I saw Kaiser today, approximately 6 weeks sp ORIF right ankle fracture, work-comp injury.  He's showing sufficient healing to start protected weight bearing in a boot.  We'll start physical therapy as well, and will see him back in 4 weeks for a recheck.    We anticipate at least 10 weeks for sufficient healing to return to work.    Thank you      Jorge Luis Gary DPM   Podiatric Foot & Ankle Surgeon  [facility]

## 2017-05-22 NOTE — PROGRESS NOTES
"Foot & Ankle Surgery  May 22, 2017    S:  Patient in today approx 6 weeks sp ORIF bimalleolar ankle fracture.  Pain levels minimal.  Following instructions, doing well. Had medial sutures removed 2 weeks ago by Dr Peña in my absence.      Pulse 78  Resp 16  Ht 6' 3.5\" (1.918 m)  Wt (!) 312 lb (141.5 kg)  BMI 38.48 kg/m2      ROS - positive for CC.  Patient denies current nausea, vomiting, chills, fevers, belly pain, calf pain, chest pain or SOB.  Complete remainder of ROS is otherwise neg.    PE - both incisions healed.  PROM and stressing of ankle neg for pain/instability.  Mild edema.  Skin shows no trophic, color or temperature changes otherwise.  No calf redness, swelling or pain noted otherwise.    Imaging - 3 views WB shows intact hardware.  Fracture lines visible but less so than previous exam.  Callus formation noted at fibular fracture    A/P - 62 year old yo patient approx 6 weeks sp above procedure  -personally reviewed imaging  -start protected heel WB in CAM with crutche;s increase weight and phase off crutches to tolerance  -RICE/tylenol prn  -continue DVT exercises until back to regular shoes  -tensogrip for edema control  -[facility] PT referral for functional rehab  -note to be off work for 10 weeks total; will update next visit    Follow up  -  4 weeks     Plan for [id number] - 10 weeks post-op    Body mass index is 38.48 kg/(m^2).  Weight management plan: Patient was referred to their PCP to discuss a diet and exercise plan.      Jorge Luis Gary DPM   Podiatric Foot & Ankle Surgeon  [facility]  [phone]    "

## 2017-05-22 NOTE — MR AVS SNAPSHOT
After Visit Summary   5/22/2017    Kaiser Sylvester    MRN: [id number]           Patient Information     Date Of Birth          1955        Visit Information        Provider Department      5/22/2017 9:00 AM Jorge Luis Gary DPM [facility] [place]        Today's Diagnoses     Post-operative state    -  1      Care Instructions      DR. GARY'S CLINIC LOCATIONS:       MONDAY - [place] TUESDAY - [place]   [address]   [place] [address] [place], [place] [id number] [phone]       WEDNESDAY - SURGERY THURSDAY AM - [place]   [phone] [address]    [place], [place] [address] [phone]       THURSDAY PM - [place] FRIDAY AM - [place]   [address] [place] [address] [place], [place] [id number] [phone]       APPT SCHEDULING: SEND FAXES TO:   [phone]     Follow Up:     BODY MASS INDEX (BMI)  Many things can cause foot and ankle problems. Foot structure, activity level, foot mechanics and injuries are common causes of pain.    One very important issue that often goes unmentioned, is body weight.  Extra weight can cause increased stress on muscles, ligaments, bones and tendons. Sometimes just a few extra pounds is all it takes to put one over her/his threshold. Without reducing that stress, it can be difficult to alleviate pain.      Some people are uncomfortable addressing this issue, but we feel it is important for you to think about it. As Foot & Ankle specialists, our job is addressing the lower extremity problem and possible causes.     Regarding extra body weight, we encourage patients to discuss diet and weight management plans with their primary care doctors. It is this team approach that gives you the best opportunity for pain relief and getting you back on your feet.                Follow-ups after your visit        Additional Services     [facility] PT, HAND, AND  CHIROPRACTIC REFERRAL       === This order will print in the [facility] Scheduling  Office ===    Physical therapy, hand therapy and chiropractic care are available through:    [facility]  [facility]    Call one easy number to schedule at any of the above locations:  [phone].    Your provider has referred you to Physical Therapy at [facility] or [facility]    Indication/Reason for Referral: 6 weeks sp ORIF right lower extremity bimalleolar ankle fracture  Onset of Illness:  6 weeks  Therapy Orders:  Evaluate and Treat  Special Programs:  None  Special Request:  Equipment: As Indicated:   Exercise: Active/Assistive ROM, Conditioning, Home Exercise Program, Posture/Body Mechanics, Progressive Strengthening, Stretching/Flexibility and gait training  Manual Therapy: Joint Mobilization and Myofascial Release/Massage  None    Additional Comments for the therapist or chiropractor:  8 sessions    Please be aware that coverage of these services is subject to the terms and limitations of your health insurance plan.  Call member services at your health plan with any benefit or coverage questions.      Please bring the following to your appointment:    >>   Your personal calendar for scheduling future appointments  >>   Comfortable clothing                  Who to contact     If you have questions or need follow up information about today's clinic visit or your schedule please contact [facility] directly at [phone].  Normal or non-critical lab and imaging results will be communicated to you by MyChart, letter or phone within 4 business days after the clinic has received the results. If you do not hear from us within 7 days, please contact the clinic through atVenuhart or phone. If you have a critical or abnormal lab result, we will notify you by phone as soon as possible.  Submit refill requests through Guvera or call your pharmacy and they will forward the  "refill request to us. Please allow 3 business days for your refill to be completed.          Additional Information About Your Visit        Prudent [email or web address] Information     [email or web address] lets you send messages to your doctor, view your test results, renew your prescriptions, schedule appointments and more. To sign up, go to [email or web address] . Click on \"Log in\" on the left side of the screen, which will take you to the Welcome page. Then click on \"Sign up Now\" on the right side of the page.     You will be asked to enter the access code listed below, as well as some personal information. Please follow the directions to create your username and password.     Your access code is: [email or web address]  Expires: 2017 12:00 PM     Your access code will  in 90 days. If you need help or a new code, please call your [facility] clinic or [phone].        Care EveryWhere ID     This is your Care EveryWhere ID. This could be used by other organizations to access your [facility] medical records  [id number]        Your Vitals Were     Pulse Respirations Height BMI (Body Mass Index)          78 16 6' 3.5\" (1.918 m) 38.48 kg/m2         Blood Pressure from Last 3 Encounters:   17 136/86   17 144/87   12 187/106    Weight from Last 3 Encounters:   17 (!) 312 lb (141.5 kg)   05/10/17 (!) 312 lb (141.5 kg)   17 (!) 312 lb (141.5 kg)              We Performed the Following     [facility] PT, HAND, AND CHIROPRACTIC REFERRAL          Today's Medication Changes          These changes are accurate as of: 17  9:23 AM.  If you have any questions, ask your nurse or doctor.               Stop taking these medicines if you haven't already. Please contact your care team if you have questions.     cephALEXin 500 MG capsule   Commonly known as:  KEFLEX   Stopped by:  Jorge Luis Gary DPM                    Primary Care Provider    Physician No Ref-Primary       No address on file        Thank you!     Thank you for " choosing [facility]  for your care. Our goal is always to provide you with excellent care. Hearing back from our patients is one way we can continue to improve our services. Please take a few minutes to complete the written survey that you may receive in the mail after your visit with us. Thank you!             Your Updated Medication List - Protect others around you: Learn how to safely use, store and throw away your medicines at www.disposemymeds.org.          This list is accurate as of: 5/22/17  9:23 AM.  Always use your most recent med list.                   Brand Name Dispense Instructions for use    GLUCOSAMINE CHONDR COMPLEX PO      Take 1 tablet by mouth daily       LISINOPRIL PO      Take 20 mg by mouth daily       order for DME     1 Device    Equipment being ordered: RollAbout x 3 months       pravastatin 40 MG tablet    PRAVACHOL     Take 40 mg by mouth daily

## 2017-05-22 NOTE — PATIENT INSTRUCTIONS
DR. TREADWELL'S CLINIC LOCATIONS:       MONDAY - [place]  TUESDAY - [place]   [address]   [place] [address] [place], [place] [id number] [phone]       WEDNESDAY - SURGERY THURSDAY AM - [place]   [phone] [address]    [place], [place] [address] [phone]       THURSDAY PM - [place] FRIDAY AM - [place]   [address] [place] [address] [place] [address]   [phone]       APPT SCHEDULING: SEND FAXES TO:   [phone]     Follow Up:     BODY MASS INDEX (BMI)  Many things can cause foot and ankle problems. Foot structure, activity level, foot mechanics and injuries are common causes of pain.    One very important issue that often goes unmentioned, is body weight.  Extra weight can cause increased stress on muscles, ligaments, bones and tendons. Sometimes just a few extra pounds is all it takes to put one over her/his threshold. Without reducing that stress, it can be difficult to alleviate pain.      Some people are uncomfortable addressing this issue, but we feel it is important for you to think about it. As Foot & Ankle specialists, our job is addressing the lower extremity problem and possible causes.     Regarding extra body weight, we encourage patients to discuss diet and weight management plans with their primary care doctors. It is this team approach that gives you the best opportunity for pain relief and getting you back on your feet.

## 2017-05-25 ENCOUNTER — THERAPY VISIT (OUTPATIENT)
Dept: PHYSICAL THERAPY | Facility: CLINIC | Age: 62
End: 2017-05-25
Payer: OTHER MISCELLANEOUS

## 2017-05-25 DIAGNOSIS — Z47.89 AFTERCARE FOLLOWING SURGERY OF THE MUSCULOSKELETAL SYSTEM: ICD-10-CM

## 2017-05-25 DIAGNOSIS — M25.571 ANKLE PAIN, RIGHT: Primary | ICD-10-CM

## 2017-05-25 PROCEDURE — 97110 THERAPEUTIC EXERCISES: CPT | Mod: GP | Performed by: PHYSICAL THERAPIST

## 2017-05-25 PROCEDURE — 97161 PT EVAL LOW COMPLEX 20 MIN: CPT | Mod: GP | Performed by: PHYSICAL THERAPIST

## 2017-05-25 NOTE — PROGRESS NOTES
Subjective:    Patient is a 62 year old male presenting with rehab right ankle/foot hpi. The history is provided by the patient. No  was used.   Kaiser Sylvester is a 62 year old male with a right ankle condition.    Condition occurred: at work.  This is a new condition  S/P ORIF right ankle fracture on 4/12/2017.  Ankle was fracture when a garage door fell on his ankle on 3/17/2017.  Patient c/o impaired ambulation on levels and stairs.  He ambulates NWB with a cam boot and knee walker.  He is unable to work or drive.    Patient reports pain:  Lateral and medial.    Pain is described as aching and is intermittent Pain Scale: 0/10.  Associated symptoms:  Loss of motion/stiffness and loss of strength. Pain is the same all the time.  Symptoms are exacerbated by walking, activity, weight bearing and standing and relieved by rest.  Since onset symptoms are gradually improving.  Special tests:  X-ray.  Previous treatment: none.    General health as reported by patient is good.  Pertinent medical history includes:  Osteoarthritis, high blood pressure and overweight.  Medical allergies: no.  Other surgeries include:  None reported.  Current medications:  High blood pressure medication.  Current occupation is [employer].  Patient is currently not working due to present treatment problem.      Barriers include:  None as reported by patient.    Red flags:  None as reported by patient.                        Objective:      Gait:    Weight Bearing Status:  NWB   Assistive Devices:  Walker and CAM            Ankle/Foot Evaluation  ROM:    AROM:    Dorsiflexion: Left:    Right:   <5  Plantarflexion: Left:     Right:  35  Inversion: Left:      Right:  <5  Eversion:     Right:  <5        Strength:    Dorsiflexion:  Right: 5/5    Pain:-  Plantarflexion: Right: 4/5   Pain:-  Inversion:Right: 4/5   Pain:-  Eversion:Right: 4/5   Pain:-                        EDEMA: Edema ankle: 1/5.                                                               General     ROS    Assessment/Plan:      Patient is a 62 year old male with right side ankle complaints.    Patient has the following significant findings with corresponding treatment plan.                Diagnosis 1:  S/P ORIF  Pain -  self management, education and home program  Decreased ROM/flexibility - therapeutic exercise, therapeutic activity and home program  Decreased joint mobility - manual therapy, therapeutic exercise, therapeutic activity and home program  Decreased strength - therapeutic exercise, therapeutic activities and home program  Impaired gait - gait training and home program  Impaired muscle performance - neuro re-education and home program  Decreased function - therapeutic activities and home program    Therapy Evaluation Codes:   1) History comprised of:   Personal factors that impact the plan of care:      None.    Comorbidity factors that impact the plan of care are:      High blood pressure.     Medications impacting care: High blood pressure.  2) Examination of Body Systems comprised of:   Body structures and functions that impact the plan of care:      Ankle.   Activity limitations that impact the plan of care are:      Driving, Lifting, Squatting/kneeling, Stairs, Standing, Walking and Working.  3) Clinical presentation characteristics are:   Stable/Uncomplicated.  4) Decision-Making    Low complexity using standardized patient assessment instrument and/or measureable assessment of functional outcome.  Cumulative Therapy Evaluation is: Low complexity.    Previous and current functional limitations:  (See Goal Flow Sheet for this information)    Short term and Long term goals: (See Goal Flow Sheet for this information)     Communication ability:  Patient appears to be able to clearly communicate and understand verbal and written communication and follow directions correctly.  Treatment Explanation - The following has been discussed with the patient:   RX  ordered/plan of care  Anticipated outcomes  Possible risks and side effects  This patient would benefit from PT intervention to resume normal activities.   Rehab potential is good.    Frequency:  2 X week, once daily  Duration:  for 3 weeks tapering to 1 X a week over 6 weeks  Discharge Plan:  Achieve all LTG.  Independent in home treatment program.  Reach maximal therapeutic benefit.    Please refer to the daily flowsheet for treatment today, total treatment time and time spent performing 1:1 timed codes.

## 2017-05-30 ENCOUNTER — THERAPY VISIT (OUTPATIENT)
Dept: PHYSICAL THERAPY | Facility: CLINIC | Age: 62
End: 2017-05-30
Payer: OTHER MISCELLANEOUS

## 2017-05-30 DIAGNOSIS — M25.571 ANKLE PAIN, RIGHT: ICD-10-CM

## 2017-05-30 DIAGNOSIS — Z47.89 AFTERCARE FOLLOWING SURGERY OF THE MUSCULOSKELETAL SYSTEM: ICD-10-CM

## 2017-05-30 PROCEDURE — 97110 THERAPEUTIC EXERCISES: CPT | Mod: GP | Performed by: PHYSICAL THERAPIST

## 2017-05-30 PROCEDURE — 97112 NEUROMUSCULAR REEDUCATION: CPT | Mod: GP | Performed by: PHYSICAL THERAPIST

## 2017-06-05 ENCOUNTER — THERAPY VISIT (OUTPATIENT)
Dept: PHYSICAL THERAPY | Facility: CLINIC | Age: 62
End: 2017-06-05
Payer: OTHER MISCELLANEOUS

## 2017-06-05 DIAGNOSIS — Z47.89 AFTERCARE FOLLOWING SURGERY OF THE MUSCULOSKELETAL SYSTEM: ICD-10-CM

## 2017-06-05 DIAGNOSIS — M25.571 ANKLE PAIN, RIGHT: ICD-10-CM

## 2017-06-05 PROCEDURE — 97110 THERAPEUTIC EXERCISES: CPT | Mod: GP | Performed by: PHYSICAL THERAPIST

## 2017-06-05 PROCEDURE — 97112 NEUROMUSCULAR REEDUCATION: CPT | Mod: GP | Performed by: PHYSICAL THERAPIST

## 2017-06-13 ENCOUNTER — THERAPY VISIT (OUTPATIENT)
Dept: PHYSICAL THERAPY | Facility: CLINIC | Age: 62
End: 2017-06-13
Payer: OTHER MISCELLANEOUS

## 2017-06-13 DIAGNOSIS — M25.571 ANKLE PAIN, RIGHT: ICD-10-CM

## 2017-06-13 DIAGNOSIS — Z47.89 AFTERCARE FOLLOWING SURGERY OF THE MUSCULOSKELETAL SYSTEM: ICD-10-CM

## 2017-06-13 PROCEDURE — 97112 NEUROMUSCULAR REEDUCATION: CPT | Mod: GP | Performed by: PHYSICAL THERAPIST

## 2017-06-13 PROCEDURE — 97110 THERAPEUTIC EXERCISES: CPT | Mod: GP | Performed by: PHYSICAL THERAPIST

## 2017-06-19 ENCOUNTER — OFFICE VISIT (OUTPATIENT)
Dept: PODIATRY | Facility: CLINIC | Age: 62
End: 2017-06-19
Payer: COMMERCIAL

## 2017-06-19 ENCOUNTER — RADIANT APPOINTMENT (OUTPATIENT)
Dept: GENERAL RADIOLOGY | Facility: CLINIC | Age: 62
End: 2017-06-19
Attending: PODIATRIST
Payer: OTHER MISCELLANEOUS

## 2017-06-19 ENCOUNTER — THERAPY VISIT (OUTPATIENT)
Dept: PHYSICAL THERAPY | Facility: CLINIC | Age: 62
End: 2017-06-19
Payer: OTHER MISCELLANEOUS

## 2017-06-19 VITALS — WEIGHT: 313.2 LBS | HEART RATE: 76 BPM | BODY MASS INDEX: 38.14 KG/M2 | HEIGHT: 76 IN

## 2017-06-19 DIAGNOSIS — M25.571 ANKLE PAIN, RIGHT: ICD-10-CM

## 2017-06-19 DIAGNOSIS — Z98.890 POST-OPERATIVE STATE: ICD-10-CM

## 2017-06-19 DIAGNOSIS — Z47.89 AFTERCARE FOLLOWING SURGERY OF THE MUSCULOSKELETAL SYSTEM: ICD-10-CM

## 2017-06-19 DIAGNOSIS — Z98.890 POST-OPERATIVE STATE: Primary | ICD-10-CM

## 2017-06-19 PROCEDURE — 99024 POSTOP FOLLOW-UP VISIT: CPT | Performed by: PODIATRIST

## 2017-06-19 PROCEDURE — 97112 NEUROMUSCULAR REEDUCATION: CPT | Mod: GP | Performed by: PHYSICAL THERAPIST

## 2017-06-19 PROCEDURE — 73610 X-RAY EXAM OF ANKLE: CPT | Mod: RT

## 2017-06-19 PROCEDURE — 97110 THERAPEUTIC EXERCISES: CPT | Mod: GP | Performed by: PHYSICAL THERAPIST

## 2017-06-19 NOTE — MR AVS SNAPSHOT
"              After Visit Summary   6/19/2017    Kaiser Sylvester    MRN: [id number]           Patient Information     Date Of Birth          1955        Visit Information        Provider Department      6/19/2017 9:00 AM Jorge Luis Gayr DPM [facility] [place]        Today's Diagnoses     Post-operative state    -  1       Follow-ups after your visit        Your next 10 appointments already scheduled     Jun 19, 2017  9:00 AM CDT   Return Visit with Jorge Luis Gary DPM   [facility] [place] ([facility])    [address]  [address]  [facility] [address]   [phone]            Jun 26, 2017  8:00 AM CDT   BRIAN Extremity with Jason Jimenez    [facility] [place] ([facility] [place]  )    [address] [place] [address]  [address]  [facility] [address]   [phone]              Who to contact     If you have questions or need follow up information about today's clinic visit or your schedule please contact [facility] directly at [phone].  Normal or non-critical lab and imaging results will be communicated to you by MyChart, letter or phone within 4 business days after the clinic has received the results. If you do not hear from us within 7 days, please contact the clinic through Smartzerhart or phone. If you have a critical or abnormal lab result, we will notify you by phone as soon as possible.  Submit refill requests through [email or web address] or call your pharmacy and they will forward the refill request to us. Please allow 3 business days for your refill to be completed.          Additional Information About Your Visit        MyChart Information     [email or web address] lets you send messages to your doctor, view your test results, renew your prescriptions, schedule appointments and more. To sign up, go to [email or web address] . Click on \"Log in\" on the left side of the screen, which will take you to the Welcome page. Then click on \"Sign up Now\" on the right side of " "the page.     You will be asked to enter the access code listed below, as well as some personal information. Please follow the directions to create your username and password.     Your access code is: [email or web address]  Expires: 2017  8:52 AM     Your access code will  in 90 days. If you need help or a new code, please call your [place] clinic or [phone].        Care EveryWhere ID     This is your Care EveryWhere ID. This could be used by other organizations to access your [place] medical records  [id number]        Your Vitals Were     Height                   6' 3.5\" (1.918 m)            Blood Pressure from Last 3 Encounters:   17 136/86   17 144/87   12 187/106    Weight from Last 3 Encounters:   17 (!) 312 lb (141.5 kg)   05/10/17 (!) 312 lb (141.5 kg)   17 (!) 312 lb (141.5 kg)                 Today's Medication Changes          These changes are accurate as of: 17  8:52 AM.  If you have any questions, ask your nurse or doctor.               Stop taking these medicines if you haven't already. Please contact your care team if you have questions.     order for DME   Stopped by:  Jorge Luis Gary DPM                    Primary Care Provider    Physician No Ref-Primary       No address on file        Thank you!     Thank you for choosing [facility] [place]  for your care. Our goal is always to provide you with excellent care. Hearing back from our patients is one way we can continue to improve our services. Please take a few minutes to complete the written survey that you may receive in the mail after your visit with us. Thank you!             Your Updated Medication List - Protect others around you: Learn how to safely use, store and throw away your medicines at www.disposemymeds.org.          This list is accurate as of: 17  8:52 AM.  Always use your most recent med list.                   Brand Name Dispense Instructions for use    GLUCOSAMINE CHONDR " COMPLEX PO      Take 1 tablet by mouth daily       LISINOPRIL PO      Take 20 mg by mouth daily       pravastatin 40 MG tablet    PRAVACHOL     Take 40 mg by mouth daily

## 2017-06-19 NOTE — PROGRESS NOTES
"Foot & Ankle Surgery  June 19, 2017    S:  Patient in today approx 10 weeks sp ORIF bimalleolar ankle fracture.  Pain levels minimal today.  Can swell and get sore after prolonged standing/ambulationg.  Has done 5-6 PT sessions.  Full WB in his sybil-top boot without pain, but can get sore/swollen with increased activities.  Using a cane.  Doesn't think he's ready to return to work.  States he can feel his \"tendons\" at the anterolateral ankle when doing his ROM exercises    Pulse 76  Ht 6' 3.5\" (1.918 m)      ROS - positive for CC.  Patient denies current nausea, vomiting, chills, fevers, belly pain, calf pain, chest pain or SOB.  Complete remainder of ROS is otherwise neg.    PE - incisions fully healed.  Stressing of ankle shows no medial, lateral or syndesmotic pain or instability..  Skin shows no trophic, color or temperature changes otherwise.  No calf redness, swelling or pain noted otherwise.    Imaging - 3 views WB shows intact hardware.  Maintained mortise.  Medial malleolar fracture line not visible.  Medial cortical fracture line of fibula still partially visible.  Callus noted posterior fibula    A/P - 62 year old yo patient approx 10 weeks sp above procedure  -personally reviewed imaging  -he's not using compression sleeve; tensogrip dispensed for edema control; advised elevation and ice vs heat prn  -finish PT; continue HEP  -RICE/tylenol prn  -note to hold off on work x 4 weeks  -high lace-up boots as able; if wearing tennis shoes, advised a brace, he will get one.    Follow up  -  4 weeks for reassessment and films or sooner with acute issues    Plan for szjzid-uc-tnsb - recheck in 4 weeks     Body mass index is 38.63 kg/(m^2).    Weight management plan: Patient was referred to their PCP to discuss a diet and exercise plan.      Jorge Luis Gary DPM   Podiatric Foot & Ankle Surgeon  [facility]  [phone]      "

## 2017-06-19 NOTE — LETTER
June 19, 2017      Kaiser Sylvester  [address] [facility] [address]    To whom it may concern:    I saw Kaiser Sylvester today, 10 weeks sp ORIF bimalleloar ankle fracture. He's making good progress, but does not feel healed sufficiently to return to his work duties.  He'll continue physical therapy and his home exercises program, and we'll see him back in 4 weeks for a recheck and update on his qhartb-ah-aejg status.      Thank you      Jorge Luis Gary DPM   Podiatric Foot & Ankle Surgeon  [facility]

## 2017-06-26 ENCOUNTER — THERAPY VISIT (OUTPATIENT)
Dept: PHYSICAL THERAPY | Facility: CLINIC | Age: 62
End: 2017-06-26
Payer: OTHER MISCELLANEOUS

## 2017-06-26 DIAGNOSIS — M25.571 ANKLE PAIN, RIGHT: ICD-10-CM

## 2017-06-26 DIAGNOSIS — Z47.89 AFTERCARE FOLLOWING SURGERY OF THE MUSCULOSKELETAL SYSTEM: ICD-10-CM

## 2017-06-26 PROCEDURE — 97112 NEUROMUSCULAR REEDUCATION: CPT | Mod: GP | Performed by: PHYSICAL THERAPIST

## 2017-06-26 PROCEDURE — 97110 THERAPEUTIC EXERCISES: CPT | Mod: GP | Performed by: PHYSICAL THERAPIST

## 2017-07-03 ENCOUNTER — THERAPY VISIT (OUTPATIENT)
Dept: PHYSICAL THERAPY | Facility: CLINIC | Age: 62
End: 2017-07-03
Payer: OTHER MISCELLANEOUS

## 2017-07-03 DIAGNOSIS — M25.571 ANKLE PAIN, RIGHT: ICD-10-CM

## 2017-07-03 DIAGNOSIS — Z47.89 AFTERCARE FOLLOWING SURGERY OF THE MUSCULOSKELETAL SYSTEM: ICD-10-CM

## 2017-07-03 PROCEDURE — 97110 THERAPEUTIC EXERCISES: CPT | Mod: GP | Performed by: PHYSICAL THERAPIST

## 2017-07-03 PROCEDURE — 97112 NEUROMUSCULAR REEDUCATION: CPT | Mod: GP | Performed by: PHYSICAL THERAPIST

## 2017-07-10 ENCOUNTER — THERAPY VISIT (OUTPATIENT)
Dept: PHYSICAL THERAPY | Facility: CLINIC | Age: 62
End: 2017-07-10
Payer: OTHER MISCELLANEOUS

## 2017-07-10 DIAGNOSIS — Z47.89 AFTERCARE FOLLOWING SURGERY OF THE MUSCULOSKELETAL SYSTEM: ICD-10-CM

## 2017-07-10 DIAGNOSIS — M25.571 ANKLE PAIN, RIGHT: ICD-10-CM

## 2017-07-10 PROCEDURE — 97110 THERAPEUTIC EXERCISES: CPT | Mod: GP | Performed by: PHYSICAL THERAPIST

## 2017-07-10 PROCEDURE — 97112 NEUROMUSCULAR REEDUCATION: CPT | Mod: GP | Performed by: PHYSICAL THERAPIST

## 2017-07-17 ENCOUNTER — THERAPY VISIT (OUTPATIENT)
Dept: PHYSICAL THERAPY | Facility: CLINIC | Age: 62
End: 2017-07-17
Payer: OTHER MISCELLANEOUS

## 2017-07-17 DIAGNOSIS — Z47.89 AFTERCARE FOLLOWING SURGERY OF THE MUSCULOSKELETAL SYSTEM: ICD-10-CM

## 2017-07-17 DIAGNOSIS — M25.571 ANKLE PAIN, RIGHT: ICD-10-CM

## 2017-07-17 PROCEDURE — 97110 THERAPEUTIC EXERCISES: CPT | Mod: GP | Performed by: PHYSICAL THERAPIST

## 2017-07-17 PROCEDURE — 97112 NEUROMUSCULAR REEDUCATION: CPT | Mod: GP | Performed by: PHYSICAL THERAPIST

## 2017-07-18 ENCOUNTER — RADIANT APPOINTMENT (OUTPATIENT)
Dept: GENERAL RADIOLOGY | Facility: CLINIC | Age: 62
End: 2017-07-18
Attending: PODIATRIST
Payer: OTHER MISCELLANEOUS

## 2017-07-18 ENCOUNTER — OFFICE VISIT (OUTPATIENT)
Dept: PODIATRY | Facility: CLINIC | Age: 62
End: 2017-07-18
Payer: OTHER MISCELLANEOUS

## 2017-07-18 VITALS
DIASTOLIC BLOOD PRESSURE: 78 MMHG | BODY MASS INDEX: 38.11 KG/M2 | HEIGHT: 76 IN | SYSTOLIC BLOOD PRESSURE: 124 MMHG | WEIGHT: 313 LBS

## 2017-07-18 DIAGNOSIS — Z98.890 POST-OPERATIVE STATE: ICD-10-CM

## 2017-07-18 DIAGNOSIS — Z98.890 POST-OPERATIVE STATE: Primary | ICD-10-CM

## 2017-07-18 PROCEDURE — 73610 X-RAY EXAM OF ANKLE: CPT | Mod: RT

## 2017-07-18 PROCEDURE — 99213 OFFICE O/P EST LOW 20 MIN: CPT | Performed by: PODIATRIST

## 2017-07-18 NOTE — MR AVS SNAPSHOT
After Visit Summary   7/18/2017    Kaiser Sylvester    MRN: [id number]           Patient Information     Date Of Birth          1955        Visit Information        Provider Department      7/18/2017 8:00 AM Jorge Luis Gary DPM [facility] [place] [facility]        Today's Diagnoses     Post-operative state    -  1      Care Instructions      DR. GARY'S CLINIC LOCATIONS:   MONDAY - [place] TUESDAY - [place]   [address] [facility]  [address]   [place] [address] [place] [address]   [phone]       THURSDAY AM - [place] THURSDAY PM - [place]   [address]   [place] [address] [place], [place] [address] [phone]       FRIDAY AM - [place] SET UP SURGERY: [phone]   [address] APPOINTMENTS: [phone]   [place] [address] AFTER HOURS: [phone]   [phone] FAX NUMBER: [phone]     Follow Up: 2-3 weeks    Body Mass Index (BMI)  Many things can cause foot and ankle problems. Foot structure, activity level, foot mechanics and injuries are common causes of pain. One very important issue that often goes unmentioned, is body weight. Extra weight can cause increased stress on muscles, ligaments, bones and tendons. Sometimes just a few extra pounds is all it takes to put one over her/his threshold. Without reducing that stress, it can be difficult to alleviate pain. Some people are uncomfortable addressing this issue, but we feel it is important for you to think about it. As Foot &  Ankle specialists, our job is addressing the lower extremity problem and possible causes. Regarding extra body weight, we encourage patients to discuss diet and weight management plans with their primary care doctors. It is this team approach that gives you the best opportunity for pain relief and getting you back on your feet.            Follow-ups after your visit        Your next 10 appointments already scheduled      "Jul 24, 2017  8:50 AM CDT   BRIAN Extremity with Rinku Hunter PT   [facility] [place] ([facility]AN[facility]LEY Chir[facility]  )    [address] [facility]  [address]  [place] [address]   [phone]            Jul 31, 2017  8:10 AM CDT   BRIAN Extremity with Rinku Hunter PT   [facility] [place] (BRIAN[facility] Chir[facility]  )    [address] [facility]  [address]  [place] [address]   [phone]              Future tests that were ordered for you today     Open Future Orders        Priority Expected Expires Ordered    CT Ankle Right w/o Contrast Routine  7/18/2018 7/18/2017            Who to contact     If you have questions or need follow up information about today's clinic visit or your schedule please contact [facility] directly at [phone].  Normal or non-critical lab and imaging results will be communicated to you by adsquarehart, letter or phone within 4 business days after the clinic has received the results. If you do not hear from us within 7 days, please contact the clinic through adsquarehart or phone. If you have a critical or abnormal lab result, we will notify you by phone as soon as possible.  Submit refill requests through [employer] or call your pharmacy and they will forward the refill request to us. Please allow 3 business days for your refill to be completed.          Additional Information About Your Visit        adsquareharMobile Iron Information     [employer] lets you send messages to your doctor, view your test results, renew your prescriptions, schedule appointments and more. To sign up, go to [email or web address] . Click on \"Log in\" on the left side of the screen, which will take you to the Welcome page. Then click on \"Sign up Now\" on the right side of the page.     You will be asked to enter the access code listed below, as well as some personal information. Please follow the directions to create your username and password.     Your access code is: [email or web address]  Expires: 9/17/2017  8:52 AM   " "  Your access code will  in 90 days. If you need help or a new code, please call your [facility] clinic or [phone].        Care EveryWhere ID     This is your Care EveryWhere ID. This could be used by other organizations to access your [facility] medical records  [id number]        Your Vitals Were     Height BMI (Body Mass Index)                6' 3.5\" (1.918 m) 38.61 kg/m2           Blood Pressure from Last 3 Encounters:   17 124/78   17 136/86   17 144/87    Weight from Last 3 Encounters:   17 (!) 313 lb (142 kg)   17 (!) 313 lb 3.2 oz (142.1 kg)   17 (!) 312 lb (141.5 kg)               Primary Care Provider    Physician No Ref-Primary       No address on file        Equal Access to Services     [facility]: Hadii shellie luthero Soisabela, waaxda luqadaha, qaybta kaalmada adeegyada, kym lara . So [facility] [phone].    ATENCIÓN: Si habla español, tiene a quinonez disposición servicios gratuitos de asistencia lingüística. Александр al [phone].    We comply with applicable federal civil rights laws and [place] laws. We do not discriminate on the basis of race, color, national origin, age, disability sex, sexual orientation or gender identity.            Thank you!     Thank you for choosing [facility]  for your care. Our goal is always to provide you with excellent care. Hearing back from our patients is one way we can continue to improve our services. Please take a few minutes to complete the written survey that you may receive in the mail after your visit with us. Thank you!             Your Updated Medication List - Protect others around you: Learn how to safely use, store and throw away your medicines at www.disposemymeds.org.          This list is accurate as of: 17  9:23 AM.  Always use your most recent med list.                   Brand Name Dispense Instructions for use Diagnosis    GLUCOSAMINE CHONDR COMPLEX PO      " Take 1 tablet by mouth daily        LISINOPRIL PO      Take 20 mg by mouth daily        pravastatin 40 MG tablet    PRAVACHOL     Take 40 mg by mouth daily

## 2017-07-18 NOTE — PATIENT INSTRUCTIONS
DR. TREADWELL'S CLINIC LOCATIONS:   MONDAY - [place]  TUESDAY - [place]   [address] [facility]  [address]   [place] [address] [place] [address]   [phone]       THURSDAY AM - [place] THURSDAY PM - [facility]   [address]   [place] [address] [place], [place] [address] [phone]       FRIDAY AM - [place] SET UP SURGERY: [phone] [address] APPOINTMENTS: [phone]   [place] [address] AFTER HOURS: [phone] FAX NUMBER: [phone]     Follow Up: 2-3 weeks    Body Mass Index (BMI)  Many things can cause foot and ankle problems. Foot structure, activity level, foot mechanics and injuries are common causes of pain. One very important issue that often goes unmentioned, is body weight. Extra weight can cause increased stress on muscles, ligaments, bones and tendons. Sometimes just a few extra pounds is all it takes to put one over her/his threshold. Without reducing that stress, it can be difficult to alleviate pain. Some people are uncomfortable addressing this issue, but we feel it is important for you to think about it. As Foot &  Ankle specialists, our job is addressing the lower extremity problem and possible causes. Regarding extra body weight, we encourage patients to discuss diet and weight management plans with their primary care doctors. It is this team approach that gives you the best opportunity for pain relief and getting you back on your feet.

## 2017-07-18 NOTE — LETTER
July 18, 2017      Kaiser Sylvester  [address] [address]        To whom it may concern:    I saw Kaiser Sylvester today, approximately 14 weeks out from ankle surgery for his work-related ankle fracture.  xrays indicate healing, and clinically he's improving.  I have ordered a CT scan to further assess healing, and we'll see Kaiser in 1-2 weeks to review the results.  We'll continue with his no-work restrictions until we have the CT results, and will update his restrictions accordingly.    Thank you      Jorge Luis Gary, MIKEY   Podiatric Foot & Ankle Surgeon  [facility]

## 2017-07-18 NOTE — NURSING NOTE
"Chief Complaint   Patient presents with     Surgical Followup     R ankle, feeling very good lately, been in PT which helps       Initial /78  Ht 6' 3.5\" (1.918 m)  Wt (!) 313 lb (142 kg)  BMI 38.61 kg/m2 Estimated body mass index is 38.61 kg/(m^2) as calculated from the following:    Height as of this encounter: 6' 3.5\" (1.918 m).    Weight as of this encounter: 313 lb (142 kg).  Medication Reconciliation: complete  "

## 2017-07-18 NOTE — PROGRESS NOTES
"Foot & Ankle Surgery  July 18, 2017    S:  Patient in today approx 14 weeks sp ORIF bimalleolar ankle fracture.  Pain levels much improved.  \"it's doing really good\".  He gets sore if he's on it all day and it has swollen, but without swelling, he essentially has no pain.  He's a little sore today as he has been doing his PT aggressively.  He's still off work    /78  Ht 6' 3.5\" (1.918 m)  Wt (!) 313 lb (142 kg)  BMI 38.61 kg/m2      ROS - positive for CC.  Patient denies current nausea, vomiting, chills, fevers, belly pain, calf pain, chest pain or SOB.  Complete remainder of ROS is otherwise neg.    PE - minimal edema noted.  Some light discomfort with my finger pressure at distal fibula with stressing of the ankle, no but pain/instability of the repaired structures .  Skin shows no trophic, color or temperature changes otherwise.  No calf redness, swelling or pain noted otherwise.    Imaging - 3  Views WB shows intact hardware, anatomic mortise.  Fibular fracture line not visible and more robust callus formation noted.  Possible loosening of 2nd most distal screw, but no other changes noted  -IMPRESSION: Again seen is prior ORIF bimalleolar fractures. No evidence for hardware failure. Interval increase in healing callus around the fibular fracture and this fracture line is becoming much less distinct. Alignment remains anatomic. Mild soft tissue swelling about the ankle remains    A/P - 62 year old yo patient approx 14 weeks sp above procedure  -personally reviewed imaging  -tensogrip and 30m TID elevation for edema control  -CT to assess healing  -note to be off work 1-2 weeks while we obtain/review CT results    Follow up  -  1-2 weeks or sooner with acute issues    Plan for mvanfn-jf-ainn - will update restrictions after CT     Body mass index is 38.61 kg/(m^2).  Weight management plan: Patient was referred to their PCP to discuss a diet and exercise plan.      Jorge Luis Gary, ALBINOM   Podiatric " Foot & Ankle Surgeon  [facility]  [phone]

## 2017-07-24 ENCOUNTER — THERAPY VISIT (OUTPATIENT)
Dept: PHYSICAL THERAPY | Facility: CLINIC | Age: 62
End: 2017-07-24
Payer: OTHER MISCELLANEOUS

## 2017-07-24 DIAGNOSIS — M25.571 ANKLE PAIN, RIGHT: ICD-10-CM

## 2017-07-24 DIAGNOSIS — Z47.89 AFTERCARE FOLLOWING SURGERY OF THE MUSCULOSKELETAL SYSTEM: ICD-10-CM

## 2017-07-24 PROCEDURE — 97112 NEUROMUSCULAR REEDUCATION: CPT | Mod: GP | Performed by: PHYSICAL THERAPIST

## 2017-07-24 PROCEDURE — 97110 THERAPEUTIC EXERCISES: CPT | Mod: GP | Performed by: PHYSICAL THERAPIST

## 2017-07-25 ENCOUNTER — HOSPITAL ENCOUNTER (OUTPATIENT)
Dept: CT IMAGING | Facility: CLINIC | Age: 62
Discharge: HOME | End: 2017-07-25
Attending: PODIATRIST | Admitting: PODIATRIST
Payer: OTHER MISCELLANEOUS

## 2017-07-25 DIAGNOSIS — Z98.890 POST-OPERATIVE STATE: ICD-10-CM

## 2017-07-25 PROCEDURE — 73700 CT LOWER EXTREMITY W/O DYE: CPT | Mod: RT

## 2017-07-28 ENCOUNTER — TELEPHONE (OUTPATIENT)
Dept: PODIATRY | Facility: CLINIC | Age: 62
End: 2017-07-28

## 2017-07-28 NOTE — TELEPHONE ENCOUNTER
Patient called 08:03, reports that he had CT of Right ankle on 07/25/17. Calling for results. Please call at [phone].    Holly MATOSN-RN Care Coordinator  [facility]

## 2017-07-31 ENCOUNTER — THERAPY VISIT (OUTPATIENT)
Dept: PHYSICAL THERAPY | Facility: CLINIC | Age: 62
End: 2017-07-31
Payer: OTHER MISCELLANEOUS

## 2017-07-31 DIAGNOSIS — Z47.89 AFTERCARE FOLLOWING SURGERY OF THE MUSCULOSKELETAL SYSTEM: ICD-10-CM

## 2017-07-31 DIAGNOSIS — M25.571 ANKLE PAIN, RIGHT: ICD-10-CM

## 2017-07-31 PROCEDURE — 97110 THERAPEUTIC EXERCISES: CPT | Mod: GP | Performed by: PHYSICAL THERAPIST

## 2017-07-31 PROCEDURE — 97112 NEUROMUSCULAR REEDUCATION: CPT | Mod: GP | Performed by: PHYSICAL THERAPIST

## 2017-07-31 NOTE — TELEPHONE ENCOUNTER
Patient had a PT appointment this morning. I stopped by and discussed his CT results with him, and gave him a printed copy of the results.  Advised he make follow up appointment in [place] for work note and restriction discussion.    Jorge Luis Gary DPM   Podiatric Foot & Ankle Surgeon  [facility]

## 2017-08-08 ENCOUNTER — OFFICE VISIT (OUTPATIENT)
Dept: PODIATRY | Facility: CLINIC | Age: 62
End: 2017-08-08
Payer: OTHER MISCELLANEOUS

## 2017-08-08 VITALS — BODY MASS INDEX: 38.24 KG/M2 | HEIGHT: 76 IN | WEIGHT: 314 LBS | HEART RATE: 80 BPM

## 2017-08-08 DIAGNOSIS — Z87.81 STATUS POST ORIF OF FRACTURE OF ANKLE: Primary | ICD-10-CM

## 2017-08-08 DIAGNOSIS — Z98.890 STATUS POST ORIF OF FRACTURE OF ANKLE: Primary | ICD-10-CM

## 2017-08-08 PROCEDURE — 99213 OFFICE O/P EST LOW 20 MIN: CPT | Performed by: PODIATRIST

## 2017-08-08 NOTE — PROGRESS NOTES
"Foot & Ankle Surgery  August 8, 2017    S:  Patient in today approx 17 weeks sp ORIF bimalleolar ankle fracture.  Pain levels minimal.  Discussed his CT results previously at his PT appointment, in today to discuss return to work and restrictions.  \"I'd like to go back to work Monday\".  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  Using a cane for R knee.  Full WB in regular shoe gear.    Pulse 80  Ht 6' 3.5\" (1.918 m)  Wt (!) 314 lb (142.4 kg)  BMI 38.73 kg/m2      ROS - positive for CC.  Patient denies current nausea, vomiting, chills, fevers, belly pain, calf pain, chest pain or SOB.  Complete remainder of ROS is otherwise neg.    PE - ankle not examined today.  Skin shows no trophic, color or temperature changes otherwise.  No calf redness, swelling or pain noted otherwise.    Imaging - CT R ankle - IMPRESSION: The previous distal tibia and fibula fractures appear to be completely healed. There is prominent adjacent callus formation.      A/P - 62 year old yo patient approx 17 weeks sp above procedure  -reviewed CT results  -note to return to work 8/14/17 4 hours per day x 2 weeks, followed by 6 hours per day x 2 weeks, then no hourly limites.  Ice, elevate and rest foot q3hx 15m prn pain    Follow up  -  3 months or sooner with acute issues    Plan for nmaaxl-aj-utxw - 8/14/17     The patient is  happy with their current post-op course and to-date operative outcome.    Body mass index is 38.73 kg/(m^2).  Weight management plan: Patient was referred to their PCP to discuss a diet and exercise plan.      Jorge Luis Gary DPM   Podiatric Foot & Ankle Surgeon  [facility]  [phone]      "

## 2017-08-08 NOTE — MR AVS SNAPSHOT
"              After Visit Summary   8/8/2017    Kaiser Sylvester    MRN: [id number]           Patient Information     Date Of Birth          1955        Visit Information        Provider Department      8/8/2017 8:15 AM Jorge Luis Gary DPM [facility]        Today's Diagnoses     Status post ORIF of fracture of ankle    -  1       Follow-ups after your visit        Your next 10 appointments already scheduled     Aug 09, 2017  8:50 AM FRANCISCAT   BRIAN Extremity with Rinku Hunter PT   [place] [facility] [place] (BRI[facility] [place]  )    [address] [facility] [address]  [facility] [address]   [phone]              Who to contact     If you have questions or need follow up information about today's clinic visit or your schedule please contact [facility] directly at [phone].  Normal or non-critical lab and imaging results will be communicated to you by MyChart, letter or phone within 4 business days after the clinic has received the results. If you do not hear from us within 7 days, please contact the clinic through MyChart or phone. If you have a critical or abnormal lab result, we will notify you by phone as soon as possible.  Submit refill requests through [employer] or call your pharmacy and they will forward the refill request to us. Please allow 3 business days for your refill to be completed.          Additional Information About Your Visit        MyChart Information     Proxly lets you send messages to your doctor, view your test results, renew your prescriptions, schedule appointments and more. To sign up, go to [email or web address] . Click on \"Log in\" on the left side of the screen, which will take you to the Welcome page. Then click on \"Sign up Now\" on the right side of the page.     You will be asked to enter the access code listed below, as well as some personal information. Please follow the directions to create your username and password.     Your access code is: " "[email or web address]  Expires: 2017  8:52 AM     Your access code will  in 90 days. If you need help or a new code, please call your [place] clinic or [phone].        Care EveryWhere ID     This is your Care EveryWhere ID. This could be used by other organizations to access your [facility] medical records  [id number]        Your Vitals Were     Pulse Height BMI (Body Mass Index)             80 6' 3.5\" (1.918 m) 38.73 kg/m2          Blood Pressure from Last 3 Encounters:   17 124/78   17 136/86   17 144/87    Weight from Last 3 Encounters:   17 (!) 314 lb (142.4 kg)   17 (!) 313 lb (142 kg)   17 (!) 313 lb 3.2 oz (142.1 kg)              Today, you had the following     No orders found for display       Primary Care Provider    Physician No Ref-Primary       No address on file        Equal Access to Services     [facility] : Hadii shellie luthero Soomaali, waaxda luqadaha, qaybta kaalmada adeegyamarc, kym lara . So [facility] [phone].    ATENCIÓN: Si habla español, tiene a quinonez disposición servicios gratuitos de asistencia lingüística. Llame al [phone].    We comply with applicable federal civil rights laws and [place] laws. We do not discriminate on the basis of race, color, national origin, age, disability sex, sexual orientation or gender identity.            Thank you!     Thank you for choosing [facility]  for your care. Our goal is always to provide you with excellent care. Hearing back from our patients is one way we can continue to improve our services. Please take a few minutes to complete the written survey that you may receive in the mail after your visit with us. Thank you!             Your Updated Medication List - Protect others around you: Learn how to safely use, store and throw away your medicines at www.disposemymeds.org.          This list is accurate as of: 17  8:40 AM.  Always use your most recent med " list.                   Brand Name Dispense Instructions for use Diagnosis    GLUCOSAMINE CHONDR COMPLEX PO      Take 1 tablet by mouth daily        LISINOPRIL PO      Take 20 mg by mouth daily        pravastatin 40 MG tablet    PRAVACHOL     Take 40 mg by mouth daily

## 2017-08-08 NOTE — LETTER
To whom it may concern:    Kaiser Sylvester has been healing well post-operative sp ankle fracture repair for a work-comp injury.  He is now cleared to return to work on a limited basis under the following guidelines:    Return to work 8/14/17, hourly guidelines:  1.  4 hour days x 2 weeks, followed by  2.  6 hour days x 2 weeks, followed by   3.  Return to work without restrictions    4.  Ice, elevate and rest the foot every 3 hours x 15 minutes as needed for pain x 4 weeks.    He will follow up in 3 months for recheck.    Thanks      Jorge Luis Gary, ALBINOM   Podiatric Foot & Ankle Surgeon  [facility]

## 2017-08-08 NOTE — NURSING NOTE
"Chief Complaint   Patient presents with     RECHECK     discuss MRI       Initial Pulse 80  Ht 6' 3.5\" (1.918 m)  Wt (!) 314 lb (142.4 kg)  BMI 38.73 kg/m2 Estimated body mass index is 38.73 kg/(m^2) as calculated from the following:    Height as of this encounter: 6' 3.5\" (1.918 m).    Weight as of this encounter: 314 lb (142.4 kg).  Medication Reconciliation: complete    "

## 2017-08-09 ENCOUNTER — THERAPY VISIT (OUTPATIENT)
Dept: PHYSICAL THERAPY | Facility: CLINIC | Age: 62
End: 2017-08-09
Payer: OTHER MISCELLANEOUS

## 2017-08-09 DIAGNOSIS — Z47.89 AFTERCARE FOLLOWING SURGERY OF THE MUSCULOSKELETAL SYSTEM: ICD-10-CM

## 2017-08-09 DIAGNOSIS — M25.571 ANKLE PAIN, RIGHT: ICD-10-CM

## 2017-08-09 PROCEDURE — 97112 NEUROMUSCULAR REEDUCATION: CPT | Mod: GP | Performed by: PHYSICAL THERAPIST

## 2017-08-09 PROCEDURE — 97110 THERAPEUTIC EXERCISES: CPT | Mod: GP | Performed by: PHYSICAL THERAPIST

## 2017-08-09 NOTE — MR AVS SNAPSHOT
"              After Visit Summary   2017    [facility]    MRN: [id number]           Patient Information     Date Of Birth          1955        Visit Information        Provider Department      2017 8:50 AM Rinku Hunter PT [facility] [place]        Today's Diagnoses     Ankle pain, right        Aftercare following surgery of the musculoskeletal system           Follow-ups after your visit        Who to contact     If you have questions or need follow up information about today's clinic visit or your schedule please contact [facility] [place] directly at [phone].  Normal or non-critical lab and imaging results will be communicated to you by Yoltohart, letter or phone within 4 business days after the clinic has received the results. If you do not hear from us within 7 days, please contact the clinic through Yoltohart or phone. If you have a critical or abnormal lab result, we will notify you by phone as soon as possible.  Submit refill requests through [employer] or call your pharmacy and they will forward the refill request to us. Please allow 3 business days for your refill to be completed.          Additional Information About Your Visit        MyChart Information     [employer] lets you send messages to your doctor, view your test results, renew your prescriptions, schedule appointments and more. To sign up, go to [email or web address] . Click on \"Log in\" on the left side of the screen, which will take you to the Welcome page. Then click on \"Sign up Now\" on the right side of the page.     You will be asked to enter the access code listed below, as well as some personal information. Please follow the directions to create your username and password.     Your access code is: [email or web address]  Expires: 2017  8:52 AM     Your access code will  in 90 days. If you need help or a new code, please call your [place] clinic or [phone].        Care EveryWhere ID  "    This is your Care EveryWhere ID. This could be used by other organizations to access your [place] medical records  [id number]         Blood Pressure from Last 3 Encounters:   07/18/17 124/78   04/12/17 136/86   03/18/17 144/87    Weight from Last 3 Encounters:   08/08/17 (!) 142.4 kg (314 lb)   07/18/17 (!) 142 kg (313 lb)   06/19/17 (!) 142.1 kg (313 lb 3.2 oz)              We Performed the Following     BRIAN PROGRESS NOTES REPORT     NEUROMUSCULAR RE-EDUCATION     THERAPEUTIC EXERCISES        Primary Care Provider    Physician No Ref-Primary       No address on file        Equal Access to Services     [facility] : Hadii shellie Craig, socorro vasquez, eva pikemamarc velazquez, kym lara . So [facility] [phone].    ATENCIÓN: Si habla español, tiene a quinonez disposición servicios gratuitos de asistencia lingüística. [facility] [phone].    We comply with applicable federal civil rights laws and [place] laws. We do not discriminate on the basis of race, color, national origin, age, disability sex, sexual orientation or gender identity.            Thank you!     Thank you for choosing [facility]  for your care. Our goal is always to provide you with excellent care. Hearing back from our patients is one way we can continue to improve our services. Please take a few minutes to complete the written survey that you may receive in the mail after your visit with us. Thank you!             Your Updated Medication List - Protect others around you: Learn how to safely use, store and throw away your medicines at www.disposemymeds.org.          This list is accurate as of: 8/9/17  9:29 AM.  Always use your most recent med list.                   Brand Name Dispense Instructions for use Diagnosis    GLUCOSAMINE CHONDR COMPLEX PO      Take 1 tablet by mouth daily        LISINOPRIL PO      Take 20 mg by mouth daily        pravastatin 40 MG tablet     PRAVACHOL     Take 40 mg by mouth daily

## 2017-08-09 NOTE — PROGRESS NOTES
Subjective:    HPI                    Objective:    System    Physical Exam    General     ROS    Assessment/Plan:      PROGRESS  REPORT    Progress reporting period is from 5/25/17 to 8/9/17.       SUBJECTIVE  Subjective changes noted by patient:    Subjective: Going back to work on Monday starting part time.  Doing ok.  Knee is really sore.  Ankle gets painful with too much activity  Current pain level is 2/10  .     Previous pain level was  6/10  .   Changes in function:  Yes (See Goal flowsheet attached for changes in current functional level)  Adverse reaction to treatment or activity: None    OBJECTIVE  Changes noted in objective findings:  Yes,   Objective: Right post tib 5/5, ant tib 5/5, peroneals 5/5, ext digit 5/5, gastroc 4-/5.      ASSESSMENT/PLAN  Updated problem list and treatment plan: Diagnosis 1:  Right ankle ORIF  Decreased strength - therapeutic exercise, therapeutic activities and home program  Decreased function - therapeutic activities and home program  STG/LTGs have been met or progress has been made towards goals:  Yes (See Goal flow sheet completed today.)  Assessment of Progress: The patient's condition is improving.  The patient's condition has potential to improve.  Self Management Plans:  Patient has been instructed in a home treatment program.  I have re-evaluated this patient and find that the nature, scope, duration and intensity of the therapy is appropriate for the medical condition of the patient.  [facility] continues to require the following intervention to meet STG and LTG's:  Patient to return to work and call with update on progress. Patient may be ready to continue with HEP but also may need further treatment.    Recommendations:  Return to work in call if continues to have problems.     Please refer to the daily flowsheet for treatment today, total treatment time and time spent performing 1:1 timed codes.

## 2017-10-09 ENCOUNTER — OFFICE VISIT (OUTPATIENT)
Dept: PODIATRY | Facility: CLINIC | Age: 62
End: 2017-10-09
Payer: COMMERCIAL

## 2017-10-09 VITALS — HEIGHT: 76 IN | RESPIRATION RATE: 16 BRPM | BODY MASS INDEX: 38.24 KG/M2 | WEIGHT: 314 LBS

## 2017-10-09 DIAGNOSIS — M76.62 ACHILLES TENDINITIS OF LEFT LOWER EXTREMITY: Primary | ICD-10-CM

## 2017-10-09 PROCEDURE — 99213 OFFICE O/P EST LOW 20 MIN: CPT | Performed by: PODIATRIST

## 2017-10-09 NOTE — PROGRESS NOTES
"Foot & Ankle Surgery   October 9, 2017    S:  Pt is seen today for evaluation of left heel pain.  He's been noticing pain and a lump at the back of the heel.  States it may have started during his PT sessions that he was doing for his right ankle after undergoing ORIF for work-comp ankle fracture. He's been using NSAIDs intermittently, wearing comfortable shoes.  He also dug out his old orthotics from 20 years ago and has used them, and states it's been feeling better.  That ankle is \"doing great\", ices every once in a while but otherwise no problems.    Vitals:    10/09/17 1514   Resp: 16   Weight: (!) 314 lb (142.4 kg)   Height: 6' 3.5\" (1.918 m)   '      ROS - Pos for CC.  Patient denies current nausea, vomiting, chills, fevers, belly pain, calf pain, chest pain or SOB.  Complete remainder of ROS it otherwise neg.      PE:  Gen:   No apparent distress  Neuro:   A&Ox3, no deficits  Psych:    Answering questions appropriately for age and situation with normal affect  Head:    NCAT  Eye:    Visual scanning without deficit  Ear:    Response to auditory stimuli wnl  Lung:    Non-labored breathing on RA noted  Abd:    NTND per patient report  Lymph:    Neg for pitting/non-pitting edema BLE  Vasc:    Pulses palpable, CFT minimally delayed  Neuro:    Light touch sensation intact to all sensory nerve distributions without paresthesias  Derm:    Neg for nodules, lesions or ulcerations  MSK:    Left lower extremity - prominent spur and tenderness at Achilles insertion.  No retrocalcaneal pain and no tendon thickening/pain noted.  Ankle ROM < 10 with knee extended, mild improvement with flexion of the knee  Calf:    Neg for redness, swelling or tenderness      Assessment:  62 year old male with left lower extremity insertional Achilles tendonitis with spurring      Plan:  Discussed etiologies/options  1.  Left lower extremity insertional Achilles tendonitis  -continue comfortable shoes and his orthotics  -RICE/NSAID " prn  -discussed/demonstrated Achilles and hamstring stretches  -discussed importance of activity modifications  -consider CAM and PT    Follow up:  3 weeks or sooner with acute issues      Body mass index is 38.73 kg/(m^2).  Weight management plan: Patient was referred to their PCP to discuss a diet and exercise plan.         Jorge Luis Gary DPM   Podiatric Foot & Ankle Surgeon  [facility]  [phone]

## 2017-10-09 NOTE — NURSING NOTE
"Chief Complaint   Patient presents with     Foot Problems     L heel pain x 6 weeks       Initial Resp 16  Ht 6' 3.5\" (1.918 m)  Wt (!) 314 lb (142.4 kg)  BMI 38.73 kg/m2 Estimated body mass index is 38.73 kg/(m^2) as calculated from the following:    Height as of this encounter: 6' 3.5\" (1.918 m).    Weight as of this encounter: 314 lb (142.4 kg).  Medication Reconciliation: complete  "

## 2017-10-09 NOTE — PATIENT INSTRUCTIONS
DR. [facility] CLIN[facility] LOCATIONS:   MONDAY - [place]  TUESDAY - [place]   [address] [facility]  [address]   [place] [address] [place] [address]   [phone]       THURSDAY AM - [place] [facility]   [address]   [place] [address] [place], [place] [address] [phone]       FRIDAY AM - [place] SET UP SURGERY: [phone] [address] APPOINTMENTS: [phone]   [place] [address] BILLING QUESTIONS: [phone] FAX NUMBER: [phone]     Follow Up: 3 wks    PLANTAR FASCIITIS    Plantar fasciitis is often referred to as heel spurs or heel pain. Plantar fasciitis is a very common problem that affects people of all foot shapes, age, weight and activity level. Pain may be in the arch or on the weight-bearing surface of the heel. The pain may come on without injury or identifiable cause. Pain is generally present when first getting out of bed in the morning or up from a seated break.     CAUSES  The plantar fascia is a dense fibrous band of tissue that stretches across the bottom surface of the foot. The fascia helps support the foot muscles and arch. Plantar fasciitis is thought to be caused by mechanical strain or overload. Frequent walking without shoes or wearing unsupportive shoes is thought to cause structural overload and ultimately inflammation of the plantar fascia. Some people have heel spurs that can be seen on x-ray. The heel spur is actually a minor component of plantar fascitis and is largely ignored.       SELF TREATMENT   The easiest solution is to stop walking around your home without shoes. Plantar fasciitis is largely a shoe problem. Shoes are either not being worn often enough or your current shoes are inadequate for your weight, foot structure or activity level. The majority of shoes on the market today are not sufficient to resist development of plantar fasciitis or to promote  healing. Assume that your current shoes are inadequate and will need to be replaced. Even high quality shoes wear out with 6 months to one year of frequent use. Weight loss is another option. Losing ten pounds in the next two months may be enough to resolve the problem. Ice applied to the area of pain two to three times per day for ten minutes each session can be very helpful. This should continue until the problem resolves. Achilles tendon stretching is essential. Stretch multiple times daily to promote healing and to prevent recurrence in the future.     MEDICAL TREATMENT  Medical treatments often include custom arch supports, cortisone injections, physical therapy, splints to be worn in bed, prescription medications and surgery. The home treatments listed above will be necessary regardless of these advanced medical treatments. Surgery is rarely needed but is very helpful in selected cases.     PROGNOSIS  Plantar fasciitis can last from one day to a lifetime. Some people get intermittent fascitis that is very short-lived. Others suffer daily for years. Excessive body weight, frequent bare foot walking, long hours on the feet, inadequate shoes, predisposing foot structures and excessive activity such as running are all potential issues that lead to chronic and/or recurring plantar fascitis. Having plantar fasciitis means that you are forever prone to this problem and will require modification of some of the above factors. Most people seek treatment within one to four months. Healing usually requires a similar one to four month time frame. Healing time is relative to the amount of effort spent treating the problem.   Plantar fasciitis is highly recurrent. Risk factors often continue, including return to bare foot walking, inadequate shoes, excessive body weight, excessive activities, etc. Your life style and foot structure may predispose you to recurrent plantar fasciitis. A daily prevention regimen can be very  helpful. Ongoing use of shoe inserts, careful attention to appropriate shoes, daily Achilles stretching, etc. may prevent recurrence. Prompt attention at the earliest warning signs of heel pain can resolve the problem in as short as a few days.     EXERCISES    Stair Exercise: Step on the stairs with the ball of your foot and hold your position for at least 15 seconds, then slowly step down with the heels of your foot. You can do this daily and as often as you want.   Picking the Towel: Sit comfortably and then pick the towel up with your toes. You can use any object other than a towel as long as the material can be soft and you can pick it up with your toes.  Rolling the Bottle: Use a small ball or frozen water bottle and then roll it around with your foot.   Flex the Toes: Sit comfortably and then flex your toes by pointing it towards the floor or towards your body. This will relax and flex your foot and exercise your plantar fascia, the calf, and the Achilles tendon. The inability of the foot to stretch often causes the bunching up of the plantar fascia area leading to the pain.  Calf/Achilles Stretching: Lay on you back and raise one foot, then point your toes towards the floor. See photo below:               Hold each stretch for 10 seconds. Stretch 10 times per set, three sets per day. Morning, afternoon and evening. If your heel pain is very severe in the morning, consider doing the first set of stretches before you get out of bed.    THERAPIES COVERED:  1.  Supportive Shoes: minimizing barefoot ambulation helps to provide cushion, padding and support to the ligament that is inflamed. Socks, flip flops, flats and some slippers are not typically sufficient to provide support. Shoes should be worn even indoors  2.  Insert/Orthotics: ones with an arch support built in to them provide further stress relief for the ligament. See the information below on recommended inserts.  3. Icing: using a frozen water bottle  or orange, and rolling it along the bottom of the arch/heel can help to alleviate discomfort, and can act as a tissue massage to the painful, inflamed ligament.  There is evidence that shows icing at least three times daily can be beneficial  4.  Antiinflammatory (NSAID): Ibuprofen, Aleve, as well as Tylenol can be used to help decrease symptoms and improve pain levels. If you have high blood pressure, heart disease, stomach or kidney problems, use antiinflammatories sparingly. Tylenol should not be used if you have liver problems.   5. Activity Modifications: if there are certain things that you do, whether it's going barefoot or certain shoes/activities, you should try to minimize those activities as much as possible until your symptoms are sufficiently resolved. Certainly, some activities, such as running on the treadmill, are easier to take a break from versus others, such as work or chores at home. If there are certain activities that hurt your heel, and you keep doing those activities that hurt your heel, your heel will keep hurting.  **If these initial therapies are insufficient, we have our tier 2 therapies that can more aggressively work to improve your symptoms and get you back to the activities that you enjoy!    OVER THE COUNTER INSERTS    SuperFeet   Sofsole Fit Spenco   Power Step   Walk-Fit Arch Cradles     Most of these can be found at your local [employer], sporting [employer] stores, or online.  **A good high quality over the counter insert should cost around $40-$50      [employer] [address]  [phone]   [address]  [phone] [place]  [address] [place] [address]  [phone]   [place]  [address] [place] [address] [address]  [phone]   [place]  [address] [facility]  [phone] [place] [address]  [phone]   [place]  [address]  [phone]   [place]  [address] [facility]  [phone] [place] [id number]  [address] [place] [phone]           Body Mass Index (BMI)  Many things can cause foot and ankle problems. Foot structure, activity level, foot mechanics and injuries are common causes of pain. One very important issue that often goes unmentioned, is body weight. Extra weight can cause increased stress on muscles, ligaments, bones and tendons. Sometimes just a few extra pounds is all it takes to put one over her/his threshold. Without reducing that stress, it can be difficult to alleviate pain. Some people are uncomfortable addressing this issue, but we feel it is important for you to think about it. As Foot &  Ankle specialists, our job is addressing the lower extremity problem and possible causes. Regarding extra body weight, we encourage patients to discuss diet and weight management plans with their primary care doctors. It is this team approach that gives you the best opportunity for pain relief and getting you back on your feet.

## 2017-10-09 NOTE — MR AVS SNAPSHOT
After Visit Summary   10/9/2017    [facility]    MRN: [id number]           Patient Information     Date Of Birth          1955        Visit Information        Provider Department      10/9/2017 3:15 PM Jorge Luis Gary DPM [facility]        Care Instructions      DR. GARY'S CLINIC LOCATIONS:   MONDAY - [place] TUESDAY - [place]   [address] [facility]  [address]   [place] [address] [place] [address]   [phone]       THURSDAY AM - [place] THURSDAY PM - [place]   [address]   [place] [address] [place], [place] [address] [phone]       FRIDAY AM - [place] SET UP SURGERY: [phone]   [address] APPOINTMENTS: [phone]   [place] [address] BILLING QUESTIONS: [phone] FAX NUMBER: [phone]     Follow Up: 3 wks    PLANTAR FASCIITIS    Plantar fasciitis is often referred to as heel spurs or heel pain. Plantar fasciitis is a very common problem that affects people of all foot shapes, age, weight and activity level. Pain may be in the arch or on the weight-bearing surface of the heel. The pain may come on without injury or identifiable cause. Pain is generally present when first getting out of bed in the morning or up from a seated break.     CAUSES  The plantar fascia is a dense fibrous band of tissue that stretches across the bottom surface of the foot. The fascia helps support the foot muscles and arch. Plantar fasciitis is thought to be caused by mechanical strain or overload. Frequent walking without shoes or wearing unsupportive shoes is thought to cause structural overload and ultimately inflammation of the plantar fascia. Some people have heel spurs that can be seen on x-ray. The heel spur is actually a minor component of plantar fascitis and is largely ignored.       SELF TREATMENT   The easiest solution is to stop walking around your home without  shoes. Plantar fasciitis is largely a shoe problem. Shoes are either not being worn often enough or your current shoes are inadequate for your weight, foot structure or activity level. The majority of shoes on the market today are not sufficient to resist development of plantar fasciitis or to promote healing. Assume that your current shoes are inadequate and will need to be replaced. Even high quality shoes wear out with 6 months to one year of frequent use. Weight loss is another option. Losing ten pounds in the next two months may be enough to resolve the problem. Ice applied to the area of pain two to three times per day for ten minutes each session can be very helpful. This should continue until the problem resolves. Achilles tendon stretching is essential. Stretch multiple times daily to promote healing and to prevent recurrence in the future.     MEDICAL TREATMENT  Medical treatments often include custom arch supports, cortisone injections, physical therapy, splints to be worn in bed, prescription medications and surgery. The home treatments listed above will be necessary regardless of these advanced medical treatments. Surgery is rarely needed but is very helpful in selected cases.     PROGNOSIS  Plantar fasciitis can last from one day to a lifetime. Some people get intermittent fascitis that is very short-lived. Others suffer daily for years. Excessive body weight, frequent bare foot walking, long hours on the feet, inadequate shoes, predisposing foot structures and excessive activity such as running are all potential issues that lead to chronic and/or recurring plantar fascitis. Having plantar fasciitis means that you are forever prone to this problem and will require modification of some of the above factors. Most people seek treatment within one to four months. Healing usually requires a similar one to four month time frame. Healing time is relative to the amount of effort spent treating the problem.    Plantar fasciitis is highly recurrent. Risk factors often continue, including return to bare foot walking, inadequate shoes, excessive body weight, excessive activities, etc. Your life style and foot structure may predispose you to recurrent plantar fasciitis. A daily prevention regimen can be very helpful. Ongoing use of shoe inserts, careful attention to appropriate shoes, daily Achilles stretching, etc. may prevent recurrence. Prompt attention at the earliest warning signs of heel pain can resolve the problem in as short as a few days.     EXERCISES    Stair Exercise: Step on the stairs with the ball of your foot and hold your position for at least 15 seconds, then slowly step down with the heels of your foot. You can do this daily and as often as you want.   Picking the Towel: Sit comfortably and then pick the towel up with your toes. You can use any object other than a towel as long as the material can be soft and you can pick it up with your toes.  Rolling the Bottle: Use a small ball or frozen water bottle and then roll it around with your foot.   Flex the Toes: Sit comfortably and then flex your toes by pointing it towards the floor or towards your body. This will relax and flex your foot and exercise your plantar fascia, the calf, and the Achilles tendon. The inability of the foot to stretch often causes the bunching up of the plantar fascia area leading to the pain.  Calf/Achilles Stretching: Lay on you back and raise one foot, then point your toes towards the floor. See photo below:               Hold each stretch for 10 seconds. Stretch 10 times per set, three sets per day. Morning, afternoon and evening. If your heel pain is very severe in the morning, consider doing the first set of stretches before you get out of bed.    THERAPIES COVERED:  1.  Supportive Shoes: minimizing barefoot ambulation helps to provide cushion, padding and support to the ligament that is inflamed. Socks, flip flops, flats and  some slippers are not typically sufficient to provide support. Shoes should be worn even indoors  2.  Insert/Orthotics: ones with an arch support built in to them provide further stress relief for the ligament. See the information below on recommended inserts.  3. Icing: using a frozen water bottle or orange, and rolling it along the bottom of the arch/heel can help to alleviate discomfort, and can act as a tissue massage to the painful, inflamed ligament.  There is evidence that shows icing at least three times daily can be beneficial  4.  Antiinflammatory (NSAID): Ibuprofen, Aleve, as well as Tylenol can be used to help decrease symptoms and improve pain levels. If you have high blood pressure, heart disease, stomach or kidney problems, use antiinflammatories sparingly. Tylenol should not be used if you have liver problems.   5. Activity Modifications: if there are certain things that you do, whether it's going barefoot or certain shoes/activities, you should try to minimize those activities as much as possible until your symptoms are sufficiently resolved. Certainly, some activities, such as running on the treadmill, are easier to take a break from versus others, such as work or chores at home. If there are certain activities that hurt your heel, and you keep doing those activities that hurt your heel, your heel will keep hurting.  **If these initial therapies are insufficient, we have our tier 2 therapies that can more aggressively work to improve your symptoms and get you back to the activities that you enjoy!    OVER THE COUNTER INSERTS    SuperFeet   Sofsole Fit Spenco   Power Step   Walk-Fit Arch Cradles     Most of these can be found at your local [employer], sporting [employer], or online.  **A good high quality over the counter insert should cost around $40-$50      [employer] [facility]  [address] [place] [address]  [phone]   [address]  [phone] [place]  [facility]  [address] [place]  [phone]   [place]  [address]  [phone]   [place]  [address]  [phone] [place] [address]  [phone]   [place]  [address] [facility]  [phone]   [place]  [address] [facility]  [phone] [place]  [address]  [phone]           Body Mass Index (BMI)  Many things can cause foot and ankle problems. Foot structure, activity level, foot mechanics and injuries are common causes of pain. One very important issue that often goes unmentioned, is body weight. Extra weight can cause increased stress on muscles, ligaments, bones and tendons. Sometimes just a few extra pounds is all it takes to put one over her/his threshold. Without reducing that stress, it can be difficult to alleviate pain. Some people are uncomfortable addressing this issue, but we feel it is important for you to think about it. As Foot &  Ankle specialists, our job is addressing the lower extremity problem and possible causes. Regarding extra body weight, we encourage patients to discuss diet and weight management plans with their primary care doctors. It is this team approach that gives you the best opportunity for pain relief and getting you back on your feet.            Follow-ups after your visit        Who to contact     If you have questions or need follow up information about today's clinic visit or your schedule please contact [facility] directly at [phone].  Normal or non-critical lab and imaging results will be communicated to you by MyChart, letter or phone within 4 business days after the clinic has received the results. If you do not hear from us within 7 days, please contact the clinic through [employer] or phone. If you have a critical or abnormal lab result, we will notify you by phone as soon as possible.  Submit refill requests through [employer] or call your pharmacy and they will forward the refill request to us.  "Please allow 3 business days for your refill to be completed.          Additional Information About Your Visit        MyChart Information     [employer] lets you send messages to your doctor, view your test results, renew your prescriptions, schedule appointments and more. To sign up, go to [email or web address] . Click on \"Log in\" on the left side of the screen, which will take you to the Welcome page. Then click on \"Sign up Now\" on the right side of the page.     You will be asked to enter the access code listed below, as well as some personal information. Please follow the directions to create your username and password.     Your access code is: [email or web address]  Expires: 2018  3:25 PM     Your access code will  in 90 days. If you need help or a new code, please call your [place] clinic or [phone].        Care EveryWhere ID     This is your [facility] EveryWhere ID. This could be used by other organizations to access your [place] medical records  [id number]        Your Vitals Were     Respirations Height BMI (Body Mass Index)             16 6' 3.5\" (1.918 m) 38.73 kg/m2          Blood Pressure from Last 3 Encounters:   17 124/78   17 136/86   17 144/87    Weight from Last 3 Encounters:   10/09/17 (!) 314 lb (142.4 kg)   17 (!) 314 lb (142.4 kg)   17 (!) 313 lb (142 kg)              Today, you had the following     No orders found for display       Primary Care Provider    Physician No Ref-Primary       NO REF-PRIMARY PHYSICIAN        Equal Access to Services     [facility]: Hadii shellie Craig, waaxda luqadaha, qaybta kaalkym valle. So [facility] [phone].    ATENCIÓN: Si habla español, tiene a quinonez disposición servicios gratuitos de asistencia lingüística. Llame al [phone].    We comply with applicable federal civil rights laws and [place] laws. We do not discriminate on the basis of race, color, national " origin, age, disability, sex, sexual orientation, or gender identity.            Thank you!     Thank you for choosing [facility]  for your care. Our goal is always to provide you with excellent care. Hearing back from our patients is one way we can continue to improve our services. Please take a few minutes to complete the written survey that you may receive in the mail after your visit with us. Thank you!             Your Updated Medication List - Protect others around you: Learn how to safely use, store and throw away your medicines at www.disposemymeds.org.          This list is accurate as of: 10/9/17  3:25 PM.  Always use your most recent med list.                   Brand Name Dispense Instructions for use Diagnosis    GLUCOSAMINE CHONDR COMPLEX PO      Take 1 tablet by mouth daily        LISINOPRIL PO      Take 20 mg by mouth daily        pravastatin 40 MG tablet    PRAVACHOL     Take 40 mg by mouth daily

## 2017-10-10 PROBLEM — M25.571 ANKLE PAIN, RIGHT: Status: RESOLVED | Noted: 2017-05-25 | Resolved: 2017-10-10

## 2017-10-10 PROBLEM — Z47.89 AFTERCARE FOLLOWING SURGERY OF THE MUSCULOSKELETAL SYSTEM: Status: RESOLVED | Noted: 2017-05-25 | Resolved: 2017-10-10

## 2017-11-27 ENCOUNTER — OFFICE VISIT (OUTPATIENT)
Dept: PODIATRY | Facility: CLINIC | Age: 62
End: 2017-11-27
Payer: OTHER MISCELLANEOUS

## 2017-11-27 ENCOUNTER — TELEPHONE (OUTPATIENT)
Dept: PODIATRY | Facility: CLINIC | Age: 62
End: 2017-11-27

## 2017-11-27 ENCOUNTER — RADIANT APPOINTMENT (OUTPATIENT)
Dept: GENERAL RADIOLOGY | Facility: CLINIC | Age: 62
End: 2017-11-27
Attending: PODIATRIST
Payer: OTHER MISCELLANEOUS

## 2017-11-27 VITALS — HEART RATE: 82 BPM | BODY MASS INDEX: 38.36 KG/M2 | HEIGHT: 76 IN | WEIGHT: 315 LBS

## 2017-11-27 DIAGNOSIS — Z98.890 STATUS POST ORIF OF FRACTURE OF ANKLE: Primary | ICD-10-CM

## 2017-11-27 DIAGNOSIS — Z87.81 STATUS POST ORIF OF FRACTURE OF ANKLE: ICD-10-CM

## 2017-11-27 DIAGNOSIS — Z87.81 STATUS POST ORIF OF FRACTURE OF ANKLE: Primary | ICD-10-CM

## 2017-11-27 DIAGNOSIS — Z98.890 STATUS POST ORIF OF FRACTURE OF ANKLE: ICD-10-CM

## 2017-11-27 PROCEDURE — 99213 OFFICE O/P EST LOW 20 MIN: CPT | Performed by: PODIATRIST

## 2017-11-27 PROCEDURE — 73610 X-RAY EXAM OF ANKLE: CPT | Mod: RT

## 2017-11-27 NOTE — TELEPHONE ENCOUNTER
Received form from [employer] regarding Health Care Provider Report, form is located in [facility] folder at the [facility]). Once form is completed please fax to [phone].  Sadaf Macedo

## 2017-11-27 NOTE — NURSING NOTE
"Chief Complaint   Patient presents with     RECHECK       Initial Pulse 82  Ht 6' 3.5\" (1.918 m)  Wt (!) 320 lb 3.2 oz (145.2 kg)  BMI 39.49 kg/m2 Estimated body mass index is 39.49 kg/(m^2) as calculated from the following:    Height as of this encounter: 6' 3.5\" (1.918 m).    Weight as of this encounter: 320 lb 3.2 oz (145.2 kg).  Medication Reconciliation: complete    Jesus Brito CMA (AAMA)  Podiatry / Foot & Ankle Surgery  [facility]    "

## 2017-11-27 NOTE — PROGRESS NOTES
"Foot & Ankle Surgery  November 27, 2017    S:  Patient in today approx 7 1/2 months sp ORIF bimalleolar ankle fracture.  Pain levels minimal.  Back to all activities without restrictions.  Occasional ache if he's been on his feet for extended periods of time, but otherwise \"it's doing really well\".      Pulse 82  Ht 6' 3.5\" (1.918 m)  Wt (!) 320 lb 3.2 oz (145.2 kg)  BMI 39.49 kg/m2      ROS - positive for CC.  Patient denies current nausea, vomiting, chills, fevers, belly pain, calf pain, chest pain or SOB.  Complete remainder of ROS is otherwise neg.    PE - minimal edema.  No pain with AROM of the ankle.  No instability reported.  No pain/instability with PROM and stressing of the joint. Skin shows no trophic, color or temperature changes otherwise.  No calf redness, swelling or pain noted otherwise.    Imaging - 3 views WB shows intact hardware, healing fractures, anatomic mortise.  Exuberant callus formation noted on films, same as seen on July CT scan    A/P - 62 year old yo patient approx 7 1/2 months sp above procedure  -personally reviewed imaging  -RICE/NSAID prn  -no activity or shoe gear restrictions; he's been back to work and in regular shoes for approximately 3+ months  -paperwork filled out stating this was a work-comp injury and he has now reached maximum medical improvement    Follow up  -  prn or sooner with acute issues    Plan for [id number] - already working     The patient is very happy with their current post-op course and to-date operative outcome.    Body mass index is 39.49 kg/(m^2).  Weight management plan: Patient was referred to their PCP to discuss a diet and exercise plan.      Jorge Luis Gary DPM   Podiatric Foot & Ankle Surgeon  [facility] ([place], [place])  [phone]      "

## 2017-11-27 NOTE — MR AVS SNAPSHOT
"              After Visit Summary   2017    [facility]    MRN: [id number]           Patient Information     Date Of Birth          1955        Visit Information        Provider Department      2017 3:30 PM Jorge Luis Gary DPM [facility]        Today's Diagnoses     Status post ORIF of fracture of ankle    -  1       Follow-ups after your visit        Who to contact     If you have questions or need follow up information about today's clinic visit or your schedule please contact [facility] directly at [phone].  Normal or non-critical lab and imaging results will be communicated to you by [employer], letter or phone within 4 business days after the clinic has received the results. If you do not hear from us within 7 days, please contact the clinic through [employer] or phone. If you have a critical or abnormal lab result, we will notify you by phone as soon as possible.  Submit refill requests through [employer] or call your pharmacy and they will forward the refill request to us. Please allow 3 business days for your refill to be completed.          Additional Information About Your Visit        MyChart Information     [employer] lets you send messages to your doctor, view your test results, renew your prescriptions, schedule appointments and more. To sign up, go to [email or web address] . Click on \"Log in\" on the left side of the screen, which will take you to the Welcome page. Then click on \"Sign up Now\" on the right side of the page.     You will be asked to enter the access code listed below, as well as some personal information. Please follow the directions to create your username and password.     Your access code is: [email or web address]  Expires: 2018  2:25 PM     Your access code will  in 90 days. If you need help or a new code, please call your [facility] or [phone].        Care EveryWhere ID     This is your Care EveryWhere ID. This could be used by " "other organizations to access your [place] medical records  [id number]        Your Vitals Were     Pulse Height BMI (Body Mass Index)             82 6' 3.5\" (1.918 m) 39.49 kg/m2          Blood Pressure from Last 3 Encounters:   07/18/17 124/78   04/12/17 136/86   03/18/17 144/87    Weight from Last 3 Encounters:   11/27/17 (!) 320 lb 3.2 oz (145.2 kg)   10/09/17 (!) 314 lb (142.4 kg)   08/08/17 (!) 314 lb (142.4 kg)               Primary Care Provider Fax #    Physician No Ref-Primary [phone]       No address on file        Equal Access to Services     [facility] : Roxane Craig, washanta vasquez, qaybta kaalmada caroline, kym ewing. So [facility] [phone].    ATENCIÓN: Si habla español, tiene a quinonez disposición servicios gratuitos de asistencia lingüística. Llame al [phone].    We comply with applicable federal civil rights laws and [place] laws. We do not discriminate on the basis of race, color, national origin, age, disability, sex, sexual orientation, or gender identity.            Thank you!     Thank you for choosing [facility]  for your care. Our goal is always to provide you with excellent care. Hearing back from our patients is one way we can continue to improve our services. Please take a few minutes to complete the written survey that you may receive in the mail after your visit with us. Thank you!             Your Updated Medication List - Protect others around you: Learn how to safely use, store and throw away your medicines at www.disposemymeds.org.          This list is accurate as of: 11/27/17  4:03 PM.  Always use your most recent med list.                   Brand Name Dispense Instructions for use Diagnosis    GLUCOSAMINE CHONDR COMPLEX PO      Take 1 tablet by mouth daily        LISINOPRIL PO      Take 20 mg by mouth daily        pravastatin 40 MG tablet    PRAVACHOL     Take 40 mg by mouth daily          "

## 2021-11-05 ENCOUNTER — LAB REQUISITION (OUTPATIENT)
Dept: LAB | Facility: CLINIC | Age: 66
End: 2021-11-05
Payer: COMMERCIAL

## 2021-11-05 DIAGNOSIS — Z13.220 ENCOUNTER FOR SCREENING FOR LIPOID DISORDERS: ICD-10-CM

## 2021-11-05 LAB
CHOLEST SERPL-MCNC: 170 MG/DL
FASTING STATUS PATIENT QL REPORTED: NO
HDLC SERPL-MCNC: 35 MG/DL
LDLC SERPL CALC-MCNC: 106 MG/DL
NONHDLC SERPL-MCNC: 135 MG/DL
TRIGL SERPL-MCNC: 143 MG/DL

## 2021-11-05 PROCEDURE — 80061 LIPID PANEL: CPT | Mod: ORL | Performed by: INTERNAL MEDICINE

## 2022-11-18 NOTE — NURSING NOTE
"Chief Complaint   Patient presents with     Surgical Followup       Initial Pulse 78  Resp 16  Ht 6' 3.5\" (1.918 m)  Wt (!) 312 lb (141.5 kg)  BMI 38.48 kg/m2 Estimated body mass index is 38.48 kg/(m^2) as calculated from the following:    Height as of this encounter: 6' 3.5\" (1.918 m).    Weight as of this encounter: 312 lb (141.5 kg).  Medication Reconciliation: complete  " Is This A New Presentation, Or A Follow-Up?: Follow Up Basal Cell Carcinoma When Was Basal Cell Biopsied? (Optional): 10/26/22 Accession # (Optional): [id number]

## 2023-11-02 ENCOUNTER — LAB REQUISITION (OUTPATIENT)
Dept: LAB | Facility: CLINIC | Age: 68
End: 2023-11-02
Payer: COMMERCIAL

## 2023-11-02 DIAGNOSIS — I10 ESSENTIAL (PRIMARY) HYPERTENSION: ICD-10-CM

## 2023-11-02 LAB
CHOLEST SERPL-MCNC: 147 MG/DL
HDLC SERPL-MCNC: 35 MG/DL
LDLC SERPL CALC-MCNC: 89 MG/DL
NONHDLC SERPL-MCNC: 112 MG/DL
TRIGL SERPL-MCNC: 113 MG/DL

## 2023-11-02 PROCEDURE — 80061 LIPID PANEL: CPT | Mod: ORL | Performed by: INTERNAL MEDICINE

## 2023-11-20 ENCOUNTER — LAB REQUISITION (OUTPATIENT)
Dept: LAB | Facility: CLINIC | Age: 68
End: 2023-11-20
Payer: COMMERCIAL

## 2023-11-20 DIAGNOSIS — Z00.00 ENCOUNTER FOR GENERAL ADULT MEDICAL EXAMINATION WITHOUT ABNORMAL FINDINGS: ICD-10-CM

## 2023-11-20 LAB — HEMOCCULT STL QL IA: NEGATIVE

## 2023-11-20 PROCEDURE — 82274 ASSAY TEST FOR BLOOD FECAL: CPT | Mod: ORL | Performed by: INTERNAL MEDICINE

## 2025-06-17 ENCOUNTER — LAB REQUISITION (OUTPATIENT)
Dept: LAB | Facility: CLINIC | Age: 70
End: 2025-06-17
Payer: COMMERCIAL

## 2025-06-17 DIAGNOSIS — Z13.220 ENCOUNTER FOR SCREENING FOR LIPOID DISORDERS: ICD-10-CM

## 2025-06-17 LAB
CHOLEST SERPL-MCNC: 154 MG/DL
FASTING STATUS PATIENT QL REPORTED: YES
HDLC SERPL-MCNC: 32 MG/DL
LDLC SERPL CALC-MCNC: 93 MG/DL
NONHDLC SERPL-MCNC: 122 MG/DL
TRIGL SERPL-MCNC: 143 MG/DL

## 2025-07-08 ENCOUNTER — LAB REQUISITION (OUTPATIENT)
Dept: LAB | Facility: CLINIC | Age: 70
End: 2025-07-08
Payer: COMMERCIAL

## 2025-07-08 DIAGNOSIS — Z12.11 ENCOUNTER FOR SCREENING FOR MALIGNANT NEOPLASM OF COLON: ICD-10-CM

## 2025-07-09 LAB — HEMOCCULT STL QL IA: NEGATIVE

## (undated) DEVICE — BLADE KNIFE SURG 15 371115

## (undated) DEVICE — DRSG GAUZE 4X4" 3033

## (undated) DEVICE — LINEN TOWEL PACK X5 5464

## (undated) DEVICE — Device

## (undated) DEVICE — CAST PADDING 4" UNSTERILE 9044

## (undated) DEVICE — DECANTER VIAL 2006S

## (undated) DEVICE — SU VICRYL 4-0 PS-2 18" UND J496H

## (undated) DEVICE — SU ETHILON 4-0 FS-2 18" 662H

## (undated) DEVICE — LINEN GOWN XLG 5407

## (undated) DEVICE — PREP CHLORAPREP 26ML TINTED ORANGE  260815

## (undated) DEVICE — SU VICRYL 3-0 TIE 12X18" J904T

## (undated) DEVICE — DRILL BIT ARTHREX BB TAK MTP THREADED AR-13226T

## (undated) DEVICE — SU VICRYL 3-0 PS-1 18" UND J683

## (undated) DEVICE — BNDG ROLLER GAUZE CONFORM 4"X4YD 41-54

## (undated) DEVICE — SU VICRYL 3-0 SH 27" J316H

## (undated) DEVICE — SU VICRYL 3-0 SH 27" UND J416H

## (undated) DEVICE — GLOVE PROTEXIS POWDER FREE 7.5 ORTHOPEDIC 2D73ET75

## (undated) DEVICE — BLADE CLIPPER 4406

## (undated) DEVICE — BNDG KLING 4" 2236

## (undated) DEVICE — DRAPE MINI C-ARM 4003

## (undated) DEVICE — PACK EXTREMITY LATEX FREE SOP32HFFCS

## (undated) RX ORDER — CEFAZOLIN SODIUM 1 G/50ML
SOLUTION INTRAVENOUS
Status: DISPENSED
Start: 2017-04-12

## (undated) RX ORDER — PROPOFOL 10 MG/ML
INJECTION, EMULSION INTRAVENOUS
Status: DISPENSED
Start: 2017-04-12

## (undated) RX ORDER — FENTANYL CITRATE 50 UG/ML
INJECTION, SOLUTION INTRAMUSCULAR; INTRAVENOUS
Status: DISPENSED
Start: 2017-04-12

## (undated) RX ORDER — ONDANSETRON 2 MG/ML
INJECTION INTRAMUSCULAR; INTRAVENOUS
Status: DISPENSED
Start: 2017-04-12

## (undated) RX ORDER — LIDOCAINE HYDROCHLORIDE 20 MG/ML
INJECTION, SOLUTION EPIDURAL; INFILTRATION; INTRACAUDAL; PERINEURAL
Status: DISPENSED
Start: 2017-04-12

## (undated) RX ORDER — CEFAZOLIN SODIUM 1 G/3ML
INJECTION, POWDER, FOR SOLUTION INTRAMUSCULAR; INTRAVENOUS
Status: DISPENSED
Start: 2017-04-12